# Patient Record
Sex: FEMALE | Race: BLACK OR AFRICAN AMERICAN | Employment: FULL TIME | URBAN - METROPOLITAN AREA
[De-identification: names, ages, dates, MRNs, and addresses within clinical notes are randomized per-mention and may not be internally consistent; named-entity substitution may affect disease eponyms.]

---

## 2017-08-07 ENCOUNTER — LAB ENCOUNTER (OUTPATIENT)
Dept: LAB | Facility: HOSPITAL | Age: 22
End: 2017-08-07
Attending: GENERAL PRACTICE
Payer: COMMERCIAL

## 2017-08-07 DIAGNOSIS — Z00.00 ADULT GENERAL MEDICAL EXAM: Primary | ICD-10-CM

## 2017-08-07 LAB
ALBUMIN SERPL BCP-MCNC: 4.4 G/DL (ref 3.5–4.8)
ALBUMIN/GLOB SERPL: 1.3 {RATIO} (ref 1–2)
ALP SERPL-CCNC: 35 U/L (ref 32–100)
ALT SERPL-CCNC: 14 U/L (ref 14–54)
ANION GAP SERPL CALC-SCNC: 8 MMOL/L (ref 0–18)
AST SERPL-CCNC: 18 U/L (ref 15–41)
BACTERIA UR QL AUTO: NEGATIVE /HPF
BASOPHILS # BLD: 0 K/UL (ref 0–0.2)
BASOPHILS NFR BLD: 1 %
BILIRUB SERPL-MCNC: 1.2 MG/DL (ref 0.3–1.2)
BILIRUB UR QL: NEGATIVE
BUN SERPL-MCNC: 9 MG/DL (ref 8–20)
BUN/CREAT SERPL: 11 (ref 10–20)
CALCIUM SERPL-MCNC: 9.7 MG/DL (ref 8.5–10.5)
CHLORIDE SERPL-SCNC: 108 MMOL/L (ref 95–110)
CHOLEST SERPL-MCNC: 122 MG/DL (ref 110–200)
CLARITY UR: CLEAR
CO2 SERPL-SCNC: 24 MMOL/L (ref 22–32)
COLOR UR: YELLOW
CREAT SERPL-MCNC: 0.82 MG/DL (ref 0.5–1.5)
EOSINOPHIL # BLD: 0.1 K/UL (ref 0–0.7)
EOSINOPHIL NFR BLD: 1 %
ERYTHROCYTE [DISTWIDTH] IN BLOOD BY AUTOMATED COUNT: 14.5 % (ref 11–15)
GLOBULIN PLAS-MCNC: 3.3 G/DL (ref 2.5–3.7)
GLUCOSE SERPL-MCNC: 126 MG/DL (ref 70–99)
GLUCOSE UR-MCNC: NEGATIVE MG/DL
HBA1C MFR BLD: 5.2 % (ref 4–6)
HCT VFR BLD AUTO: 39.3 % (ref 35–48)
HDLC SERPL-MCNC: 43 MG/DL
HGB BLD-MCNC: 13 G/DL (ref 12–16)
LDLC SERPL CALC-MCNC: 70 MG/DL (ref 0–99)
LEUKOCYTE ESTERASE UR QL STRIP.AUTO: NEGATIVE
LYMPHOCYTES # BLD: 1.9 K/UL (ref 1–4)
LYMPHOCYTES NFR BLD: 28 %
MCH RBC QN AUTO: 28.3 PG (ref 27–32)
MCHC RBC AUTO-ENTMCNC: 33.1 G/DL (ref 32–37)
MCV RBC AUTO: 85.5 FL (ref 80–100)
MONOCYTES # BLD: 0.6 K/UL (ref 0–1)
MONOCYTES NFR BLD: 9 %
NEUTROPHILS # BLD AUTO: 4.4 K/UL (ref 1.8–7.7)
NEUTROPHILS NFR BLD: 62 %
NITRITE UR QL STRIP.AUTO: NEGATIVE
NONHDLC SERPL-MCNC: 79 MG/DL
OSMOLALITY UR CALC.SUM OF ELEC: 290 MOSM/KG (ref 275–295)
PH UR: 5 [PH] (ref 5–8)
PLATELET # BLD AUTO: 201 K/UL (ref 140–400)
PMV BLD AUTO: 9 FL (ref 7.4–10.3)
POTASSIUM SERPL-SCNC: 3.9 MMOL/L (ref 3.3–5.1)
PROT SERPL-MCNC: 7.7 G/DL (ref 5.9–8.4)
PROT UR-MCNC: 30 MG/DL
RBC # BLD AUTO: 4.59 M/UL (ref 3.7–5.4)
RBC #/AREA URNS AUTO: 2 /HPF
SODIUM SERPL-SCNC: 140 MMOL/L (ref 136–144)
SP GR UR STRIP: 1.03 (ref 1–1.03)
T3FREE SERPL-MCNC: 3.51 PG/ML (ref 2.53–4.29)
T4 FREE SERPL-MCNC: 0.84 NG/DL (ref 0.58–1.64)
TRIGL SERPL-MCNC: 45 MG/DL (ref 1–149)
TSH SERPL-ACNC: 0.44 UIU/ML (ref 0.45–5.33)
UROBILINOGEN UR STRIP-ACNC: <2
VIT C UR-MCNC: NEGATIVE MG/DL
WBC # BLD AUTO: 7 K/UL (ref 4–11)
WBC #/AREA URNS AUTO: <1 /HPF

## 2017-08-07 PROCEDURE — 84439 ASSAY OF FREE THYROXINE: CPT

## 2017-08-07 PROCEDURE — 83036 HEMOGLOBIN GLYCOSYLATED A1C: CPT

## 2017-08-07 PROCEDURE — 85025 COMPLETE CBC W/AUTO DIFF WBC: CPT

## 2017-08-07 PROCEDURE — 80053 COMPREHEN METABOLIC PANEL: CPT

## 2017-08-07 PROCEDURE — 84480 ASSAY TRIIODOTHYRONINE (T3): CPT

## 2017-08-07 PROCEDURE — 36415 COLL VENOUS BLD VENIPUNCTURE: CPT

## 2017-08-07 PROCEDURE — 81001 URINALYSIS AUTO W/SCOPE: CPT

## 2017-08-07 PROCEDURE — 80061 LIPID PANEL: CPT

## 2017-08-07 PROCEDURE — 84443 ASSAY THYROID STIM HORMONE: CPT

## 2017-08-07 PROCEDURE — 84481 FREE ASSAY (FT-3): CPT

## 2017-08-08 LAB
T3 SERPL-MCNC: 0.96 NG/ML (ref 0.87–1.78)
T4 SERPL-MCNC: 7.5 MCG/DL (ref 6.1–12.2)

## 2018-03-14 ENCOUNTER — OFFICE VISIT (OUTPATIENT)
Dept: INTERNAL MEDICINE CLINIC | Facility: CLINIC | Age: 23
End: 2018-03-14

## 2018-03-14 VITALS
WEIGHT: 164 LBS | OXYGEN SATURATION: 100 % | DIASTOLIC BLOOD PRESSURE: 78 MMHG | HEIGHT: 67 IN | TEMPERATURE: 99 F | BODY MASS INDEX: 25.74 KG/M2 | SYSTOLIC BLOOD PRESSURE: 128 MMHG | HEART RATE: 84 BPM

## 2018-03-14 DIAGNOSIS — R68.89 COLD INTOLERANCE: Primary | ICD-10-CM

## 2018-03-14 DIAGNOSIS — R79.89 ABNORMAL THYROID BLOOD TEST: ICD-10-CM

## 2018-03-14 DIAGNOSIS — Z86.19 HISTORY OF HPV INFECTION: ICD-10-CM

## 2018-03-14 DIAGNOSIS — E61.1 IRON DEFICIENCY: ICD-10-CM

## 2018-03-14 DIAGNOSIS — Z83.49 FAMILY HISTORY OF THYROID DISEASE: ICD-10-CM

## 2018-03-14 LAB
BASOPHILS # BLD: 0 K/UL (ref 0–0.2)
BASOPHILS NFR BLD: 1 %
EOSINOPHIL # BLD: 0.1 K/UL (ref 0–0.7)
EOSINOPHIL NFR BLD: 1 %
ERYTHROCYTE [DISTWIDTH] IN BLOOD BY AUTOMATED COUNT: 14.8 % (ref 11–15)
HCT VFR BLD AUTO: 40.1 % (ref 35–48)
HGB BLD-MCNC: 13.3 G/DL (ref 12–16)
IRON SATN MFR SERPL: 24 % (ref 15–50)
IRON SERPL-MCNC: 90 MCG/DL (ref 28–170)
LYMPHOCYTES # BLD: 2.2 K/UL (ref 1–4)
LYMPHOCYTES NFR BLD: 28 %
MCH RBC QN AUTO: 28 PG (ref 27–32)
MCHC RBC AUTO-ENTMCNC: 33.2 G/DL (ref 32–37)
MCV RBC AUTO: 84.5 FL (ref 80–100)
MONOCYTES # BLD: 0.8 K/UL (ref 0–1)
MONOCYTES NFR BLD: 10 %
NEUTROPHILS # BLD AUTO: 4.7 K/UL (ref 1.8–7.7)
NEUTROPHILS NFR BLD: 60 %
PLATELET # BLD AUTO: 231 K/UL (ref 140–400)
PMV BLD AUTO: 9.1 FL (ref 7.4–10.3)
RBC # BLD AUTO: 4.75 M/UL (ref 3.7–5.4)
TIBC SERPL-MCNC: 370 MCG/DL (ref 228–428)
TRANSFERRIN SERPL-MCNC: 280 MG/DL (ref 192–382)
WBC # BLD AUTO: 7.8 K/UL (ref 4–11)

## 2018-03-14 PROCEDURE — 36415 COLL VENOUS BLD VENIPUNCTURE: CPT | Performed by: INTERNAL MEDICINE

## 2018-03-14 PROCEDURE — 84443 ASSAY THYROID STIM HORMONE: CPT | Performed by: INTERNAL MEDICINE

## 2018-03-14 PROCEDURE — 84439 ASSAY OF FREE THYROXINE: CPT | Performed by: INTERNAL MEDICINE

## 2018-03-14 PROCEDURE — 86376 MICROSOMAL ANTIBODY EACH: CPT | Performed by: INTERNAL MEDICINE

## 2018-03-14 PROCEDURE — 99203 OFFICE O/P NEW LOW 30 MIN: CPT | Performed by: INTERNAL MEDICINE

## 2018-03-14 PROCEDURE — 82728 ASSAY OF FERRITIN: CPT | Performed by: INTERNAL MEDICINE

## 2018-03-14 PROCEDURE — 84480 ASSAY TRIIODOTHYRONINE (T3): CPT | Performed by: INTERNAL MEDICINE

## 2018-03-14 PROCEDURE — 83540 ASSAY OF IRON: CPT | Performed by: INTERNAL MEDICINE

## 2018-03-14 PROCEDURE — 85025 COMPLETE CBC W/AUTO DIFF WBC: CPT | Performed by: INTERNAL MEDICINE

## 2018-03-14 PROCEDURE — 84466 ASSAY OF TRANSFERRIN: CPT | Performed by: INTERNAL MEDICINE

## 2018-03-14 PROCEDURE — 82306 VITAMIN D 25 HYDROXY: CPT | Performed by: INTERNAL MEDICINE

## 2018-03-14 RX ORDER — MEDROXYPROGESTERONE ACETATE 150 MG/ML
INJECTION, SUSPENSION INTRAMUSCULAR
Refills: 3 | COMMUNITY
Start: 2018-02-05 | End: 2018-05-18

## 2018-03-15 LAB
FERRITIN SERPL IA-MCNC: 32 NG/ML (ref 11–307)
T3 SERPL-MCNC: 1.2 NG/ML (ref 0.87–1.78)
T4 FREE SERPL-MCNC: 0.76 NG/DL (ref 0.58–1.64)
THYROPEROXIDASE AB SERPL-ACNC: 0.3 IU/ML (ref 0–9)
TSH SERPL-ACNC: 0.46 UIU/ML (ref 0.45–5.33)

## 2018-03-16 LAB — 25(OH)D3 SERPL-MCNC: 9.1 NG/ML

## 2018-03-19 ENCOUNTER — TELEPHONE (OUTPATIENT)
Dept: INTERNAL MEDICINE CLINIC | Facility: CLINIC | Age: 23
End: 2018-03-19

## 2018-03-19 RX ORDER — ERGOCALCIFEROL 1.25 MG/1
50000 CAPSULE ORAL WEEKLY
Qty: 12 CAPSULE | Refills: 1 | Status: SHIPPED | OUTPATIENT
Start: 2018-03-19 | End: 2018-04-20

## 2018-03-20 ENCOUNTER — TELEPHONE (OUTPATIENT)
Dept: INTERNAL MEDICINE CLINIC | Facility: CLINIC | Age: 23
End: 2018-03-20

## 2018-03-22 ENCOUNTER — TELEPHONE (OUTPATIENT)
Dept: INTERNAL MEDICINE CLINIC | Facility: CLINIC | Age: 23
End: 2018-03-22

## 2018-03-22 NOTE — TELEPHONE ENCOUNTER
Dear Maye Denver,   Your blood work are within normal except for low vitamin d :Low vitamin d : recommend prescription strength vitamin d once per week x 6 months( sent to pharmacy ) .    Your iron is normal but on the lower normal make sure you take multivitam

## 2018-04-03 ENCOUNTER — OFFICE VISIT (OUTPATIENT)
Dept: INTERNAL MEDICINE CLINIC | Facility: CLINIC | Age: 23
End: 2018-04-03

## 2018-04-03 VITALS
HEIGHT: 67 IN | TEMPERATURE: 99 F | OXYGEN SATURATION: 100 % | DIASTOLIC BLOOD PRESSURE: 70 MMHG | HEART RATE: 76 BPM | WEIGHT: 168 LBS | BODY MASS INDEX: 26.37 KG/M2 | SYSTOLIC BLOOD PRESSURE: 118 MMHG

## 2018-04-03 DIAGNOSIS — Z12.4 SCREENING FOR CERVICAL CANCER: ICD-10-CM

## 2018-04-03 DIAGNOSIS — Z11.8 SCREENING FOR CHLAMYDIAL DISEASE: ICD-10-CM

## 2018-04-03 DIAGNOSIS — N89.8 FOUL SMELLING VAGINAL DISCHARGE: ICD-10-CM

## 2018-04-03 DIAGNOSIS — Z00.00 ANNUAL PHYSICAL EXAM: Primary | ICD-10-CM

## 2018-04-03 PROCEDURE — 99395 PREV VISIT EST AGE 18-39: CPT | Performed by: INTERNAL MEDICINE

## 2018-04-03 PROCEDURE — 87205 SMEAR GRAM STAIN: CPT | Performed by: INTERNAL MEDICINE

## 2018-04-03 PROCEDURE — 87491 CHLMYD TRACH DNA AMP PROBE: CPT | Performed by: INTERNAL MEDICINE

## 2018-04-03 PROCEDURE — 87624 HPV HI-RISK TYP POOLED RSLT: CPT | Performed by: INTERNAL MEDICINE

## 2018-04-03 PROCEDURE — 87591 N.GONORRHOEAE DNA AMP PROB: CPT | Performed by: INTERNAL MEDICINE

## 2018-04-03 PROCEDURE — 87808 TRICHOMONAS ASSAY W/OPTIC: CPT | Performed by: INTERNAL MEDICINE

## 2018-04-03 PROCEDURE — 87106 FUNGI IDENTIFICATION YEAST: CPT | Performed by: INTERNAL MEDICINE

## 2018-04-03 NOTE — PROGRESS NOTES
Tanisha Alicea is a 20year old female.     Chief complaint:Annual physical exam   HPI:   20year old female with PMH as listed below here for   Annual physical exam   Patient reports feeling well   No chest pain no sob no abdominal pain  No diarrhea or c the the HPI            EXAM:   /70   Pulse 76   Temp 99.1 °F (37.3 °C)   Ht 67\"   Wt 168 lb   LMP 03/01/2018 (Exact Date)   SpO2 100%   BMI 26.31 kg/m²   GENERAL: well developed, well nourished,in no apparent distress  SKIN: no rashes,no suspicious

## 2018-04-20 ENCOUNTER — OFFICE VISIT (OUTPATIENT)
Dept: OBGYN CLINIC | Facility: CLINIC | Age: 23
End: 2018-04-20

## 2018-04-20 VITALS
DIASTOLIC BLOOD PRESSURE: 74 MMHG | HEIGHT: 67 IN | HEART RATE: 73 BPM | WEIGHT: 170 LBS | BODY MASS INDEX: 26.68 KG/M2 | SYSTOLIC BLOOD PRESSURE: 129 MMHG

## 2018-04-20 DIAGNOSIS — Z30.09 BIRTH CONTROL COUNSELING: Primary | ICD-10-CM

## 2018-04-20 PROCEDURE — 99214 OFFICE O/P EST MOD 30 MIN: CPT | Performed by: OBSTETRICS & GYNECOLOGY

## 2018-04-20 NOTE — PROGRESS NOTES
Esperanza Nicholson is a 20year old female [id number] Patient's last menstrual period was 04/01/2018. Patient presents today for birth control counseling. She had her annual exam with PCP with normal pap and HPV negative.  She is currently on Depo and doing we Allergies      Review of Systems:  Constitutional:  Denies fevers or chills   Cardiovascular:  denies chest pain or palpitations  Respiratory:  denies shortness of breath  Gastrointestinal:  denies nausea, vomiting, diarrhea or constipation  Genitourinary:

## 2018-04-23 RX ORDER — ERGOCALCIFEROL 1.25 MG/1
50000 CAPSULE ORAL WEEKLY
Qty: 12 CAPSULE | Refills: 1 | Status: SHIPPED
Start: 2018-04-23 | End: 2018-05-18

## 2018-04-23 NOTE — TELEPHONE ENCOUNTER
From: Seun Martinez  Sent: 4/20/2018 4:59 PM CDT  Subject: Medication Renewal Request    Mónica Bansal would like a refill of the following medications:     ergocalciferol 92732 units Oral Cap Rochelle Burciaga MD]    [facility]

## 2018-05-18 ENCOUNTER — OFFICE VISIT (OUTPATIENT)
Dept: OBGYN CLINIC | Facility: CLINIC | Age: 23
End: 2018-05-18

## 2018-05-18 VITALS
WEIGHT: 176 LBS | SYSTOLIC BLOOD PRESSURE: 129 MMHG | DIASTOLIC BLOOD PRESSURE: 80 MMHG | BODY MASS INDEX: 28 KG/M2 | HEART RATE: 79 BPM

## 2018-05-18 DIAGNOSIS — Z32.00 PREGNANCY EXAMINATION OR TEST, PREGNANCY UNCONFIRMED: Primary | ICD-10-CM

## 2018-05-18 DIAGNOSIS — Z30.017 INSERTION OF IMPLANTABLE SUBDERMAL CONTRACEPTIVE: ICD-10-CM

## 2018-05-18 PROCEDURE — 81025 URINE PREGNANCY TEST: CPT | Performed by: OBSTETRICS & GYNECOLOGY

## 2018-05-18 PROCEDURE — 11981 INSERTION DRUG DLVR IMPLANT: CPT | Performed by: OBSTETRICS & GYNECOLOGY

## 2018-05-18 NOTE — PROCEDURES
Nexplanon Insertion    Pregnancy Results: negative  Consent was obtained from the patient. Insertion  The patient was positioned with her left arm flexed. Measurement was taken from her epicondyle approximately 8 cm and marked.   2% lidocaine with e

## 2018-05-18 NOTE — TELEPHONE ENCOUNTER
From: Sanchez Hook  Sent: 5/18/2018 2:10 PM CDT  Subject: Medication Renewal Request    Mónica Hess would like a refill of the following medications:     ergocalciferol 58455 units Oral Cap Jacqueline Arteaga MD]    Preferred pharmacy: [facility]

## 2018-05-21 RX ORDER — ERGOCALCIFEROL 1.25 MG/1
50000 CAPSULE ORAL WEEKLY
Qty: 12 CAPSULE | Refills: 1 | Status: SHIPPED
Start: 2018-05-21 | End: 2018-08-07

## 2019-04-16 ENCOUNTER — LAB ENCOUNTER (OUTPATIENT)
Dept: LAB | Facility: HOSPITAL | Age: 24
End: 2019-04-16
Attending: INTERNAL MEDICINE
Payer: MEDICAID

## 2019-04-16 ENCOUNTER — OFFICE VISIT (OUTPATIENT)
Dept: INTERNAL MEDICINE CLINIC | Facility: CLINIC | Age: 24
End: 2019-04-16
Payer: MEDICAID

## 2019-04-16 VITALS
TEMPERATURE: 99 F | HEART RATE: 85 BPM | SYSTOLIC BLOOD PRESSURE: 124 MMHG | BODY MASS INDEX: 29.03 KG/M2 | RESPIRATION RATE: 17 BRPM | DIASTOLIC BLOOD PRESSURE: 62 MMHG | WEIGHT: 185 LBS | OXYGEN SATURATION: 99 % | HEIGHT: 67 IN

## 2019-04-16 DIAGNOSIS — Z02.89 ENCOUNTER FOR COMPLETION OF FORM WITH PATIENT: ICD-10-CM

## 2019-04-16 DIAGNOSIS — Z00.00 ANNUAL PHYSICAL EXAM: Primary | ICD-10-CM

## 2019-04-16 DIAGNOSIS — Z00.00 ANNUAL PHYSICAL EXAM: ICD-10-CM

## 2019-04-16 PROCEDURE — 80053 COMPREHEN METABOLIC PANEL: CPT

## 2019-04-16 PROCEDURE — 80061 LIPID PANEL: CPT

## 2019-04-16 PROCEDURE — 90715 TDAP VACCINE 7 YRS/> IM: CPT | Performed by: INTERNAL MEDICINE

## 2019-04-16 PROCEDURE — 86762 RUBELLA ANTIBODY: CPT

## 2019-04-16 PROCEDURE — 36415 COLL VENOUS BLD VENIPUNCTURE: CPT

## 2019-04-16 PROCEDURE — 90471 IMMUNIZATION ADMIN: CPT | Performed by: INTERNAL MEDICINE

## 2019-04-16 PROCEDURE — 86480 TB TEST CELL IMMUN MEASURE: CPT

## 2019-04-16 PROCEDURE — 99395 PREV VISIT EST AGE 18-39: CPT | Performed by: INTERNAL MEDICINE

## 2019-04-16 PROCEDURE — 85025 COMPLETE CBC W/AUTO DIFF WBC: CPT

## 2019-04-16 PROCEDURE — 86765 RUBEOLA ANTIBODY: CPT

## 2019-04-16 PROCEDURE — 86735 MUMPS ANTIBODY: CPT

## 2019-04-16 NOTE — PROGRESS NOTES
Bryant Melendez is a 24year old female.     Chief complaint: annual physical exam     HPI:   24year old female with PMH as listed below here for   Annual physical exam   Patient reports feeling well       No chest pain no sob no abdominal pain  No diarrh kg/m²   GENERAL: well developed, well nourished,in no apparent distress  SKIN: no rashes,no suspicious lesions  HEENT: atraumatic, normocephalic,ears and throat are clear  NECK: supple,no adenopathy  Breast exam : normal no lumps no discharge     LUNGS: cl

## 2019-04-18 ENCOUNTER — TELEPHONE (OUTPATIENT)
Dept: INTERNAL MEDICINE CLINIC | Facility: CLINIC | Age: 24
End: 2019-04-18

## 2019-04-18 ENCOUNTER — NURSE ONLY (OUTPATIENT)
Dept: INTERNAL MEDICINE CLINIC | Facility: CLINIC | Age: 24
End: 2019-04-18
Payer: MEDICAID

## 2019-04-18 PROCEDURE — 90471 IMMUNIZATION ADMIN: CPT | Performed by: INTERNAL MEDICINE

## 2019-04-18 PROCEDURE — 90707 MMR VACCINE SC: CPT | Performed by: INTERNAL MEDICINE

## 2019-04-18 NOTE — TELEPHONE ENCOUNTER
Spoke to patient will come in today to get MMR - & needs buster in 1 month per Dr. Alexa Fagan. I will take form to [facility] 5 Monday so she can fill it out.

## 2019-11-15 ENCOUNTER — NURSE ONLY (OUTPATIENT)
Dept: INTERNAL MEDICINE CLINIC | Facility: CLINIC | Age: 24
End: 2019-11-15

## 2019-11-15 PROCEDURE — 90471 IMMUNIZATION ADMIN: CPT | Performed by: INTERNAL MEDICINE

## 2019-11-15 PROCEDURE — 90707 MMR VACCINE SC: CPT | Performed by: INTERNAL MEDICINE

## 2020-06-17 ENCOUNTER — APPOINTMENT (OUTPATIENT)
Dept: CT IMAGING | Facility: HOSPITAL | Age: 25
End: 2020-06-17
Attending: PHYSICIAN ASSISTANT
Payer: MEDICAID

## 2020-06-17 ENCOUNTER — HOSPITAL ENCOUNTER (EMERGENCY)
Facility: HOSPITAL | Age: 25
Discharge: HOME | End: 2020-06-17
Attending: PHYSICIAN ASSISTANT
Payer: MEDICAID

## 2020-06-17 VITALS
DIASTOLIC BLOOD PRESSURE: 86 MMHG | OXYGEN SATURATION: 100 % | HEART RATE: 75 BPM | SYSTOLIC BLOOD PRESSURE: 116 MMHG | RESPIRATION RATE: 18 BRPM | TEMPERATURE: 99 F

## 2020-06-17 DIAGNOSIS — R55 SYNCOPE, UNSPECIFIED SYNCOPE TYPE: Primary | ICD-10-CM

## 2020-06-17 PROCEDURE — 99285 EMERGENCY DEPT VISIT HI MDM: CPT

## 2020-06-17 PROCEDURE — 96360 HYDRATION IV INFUSION INIT: CPT

## 2020-06-17 PROCEDURE — 96361 HYDRATE IV INFUSION ADD-ON: CPT

## 2020-06-17 PROCEDURE — 81001 URINALYSIS AUTO W/SCOPE: CPT | Performed by: PHYSICIAN ASSISTANT

## 2020-06-17 PROCEDURE — 80048 BASIC METABOLIC PNL TOTAL CA: CPT | Performed by: PHYSICIAN ASSISTANT

## 2020-06-17 PROCEDURE — 70450 CT HEAD/BRAIN W/O DYE: CPT | Performed by: PHYSICIAN ASSISTANT

## 2020-06-17 PROCEDURE — 93005 ELECTROCARDIOGRAM TRACING: CPT

## 2020-06-17 PROCEDURE — 81025 URINE PREGNANCY TEST: CPT

## 2020-06-17 PROCEDURE — 93010 ELECTROCARDIOGRAM REPORT: CPT | Performed by: PHYSICIAN ASSISTANT

## 2020-06-17 PROCEDURE — 85025 COMPLETE CBC W/AUTO DIFF WBC: CPT | Performed by: PHYSICIAN ASSISTANT

## 2020-06-17 NOTE — ED PROVIDER NOTES
Patient Seen in: [facility] AND [facility] Emergency Department    History   Patient presents with:  Syncope    Stated Complaint:     HPI    19-year-old female presents with chief complaint of syncope.   Onset 30 minutes prior to arrival.  Patient states that i vital signs reviewed. All other systems reviewed and negative except as noted above. PSFH elements reviewed from today and agreed except as otherwise stated in HPI.     Physical Exam     ED Triage Vitals   BP 06/17/20 1210 122/73   Pulse 06/17/20 12 of all extremities x4. Patient exhibits normal speech. Normal gait. No limb ataxia. Skin: Skin is normal to inspection and palpation. Warm and dry. No obvious bruising. No obvious rash.       ED Course     Labs Reviewed   BASIC METABOLIC PANEL (8) - [place] [id number]  [phone]    Schedule an appointment as soon as possible for a visit in 2 days  For follow-up    We recommend that you schedule follow up care with a primary care provider within the next three months to obtain basic health screening including re

## 2021-06-23 ENCOUNTER — TELEPHONE (OUTPATIENT)
Dept: OBGYN CLINIC | Facility: CLINIC | Age: 26
End: 2021-06-23

## 2021-06-23 NOTE — TELEPHONE ENCOUNTER
Former patient of Dr Soto Stark would like to schedule an appointment to have her Nexplanon removed and replaced. Please call.

## 2021-06-23 NOTE — TELEPHONE ENCOUNTER
PSR pt needs to come in for annual first, please assist with scheduling. Then will need a separate appt for Nexplanon removal and insertion. Pt has not been seen since Nexplanon insertion in May 2018.

## 2021-06-30 ENCOUNTER — LAB ENCOUNTER (OUTPATIENT)
Dept: LAB | Age: 26
End: 2021-06-30
Attending: FAMILY MEDICINE
Payer: MEDICAID

## 2021-06-30 ENCOUNTER — OFFICE VISIT (OUTPATIENT)
Dept: FAMILY MEDICINE CLINIC | Facility: CLINIC | Age: 26
End: 2021-06-30
Payer: MEDICAID

## 2021-06-30 VITALS
HEIGHT: 67 IN | TEMPERATURE: 98 F | BODY MASS INDEX: 28.66 KG/M2 | DIASTOLIC BLOOD PRESSURE: 74 MMHG | SYSTOLIC BLOOD PRESSURE: 124 MMHG | RESPIRATION RATE: 18 BRPM | HEART RATE: 72 BPM | WEIGHT: 182.63 LBS

## 2021-06-30 DIAGNOSIS — Z13.29 THYROID DISORDER SCREEN: ICD-10-CM

## 2021-06-30 DIAGNOSIS — Z13.220 LIPID SCREENING: ICD-10-CM

## 2021-06-30 DIAGNOSIS — Z00.00 WELLNESS EXAMINATION: Primary | ICD-10-CM

## 2021-06-30 DIAGNOSIS — L30.9 ECZEMA, UNSPECIFIED TYPE: ICD-10-CM

## 2021-06-30 DIAGNOSIS — Z13.0 SCREENING FOR DEFICIENCY ANEMIA: ICD-10-CM

## 2021-06-30 DIAGNOSIS — Z00.00 WELLNESS EXAMINATION: ICD-10-CM

## 2021-06-30 DIAGNOSIS — Z30.46 ENCOUNTER FOR SURVEILLANCE OF NEXPLANON SUBDERMAL CONTRACEPTIVE: ICD-10-CM

## 2021-06-30 LAB
ALBUMIN SERPL-MCNC: 3.5 G/DL (ref 3.4–5)
ALBUMIN/GLOB SERPL: 0.8 {RATIO} (ref 1–2)
ALP LIVER SERPL-CCNC: 47 U/L
ALT SERPL-CCNC: 24 U/L
ANION GAP SERPL CALC-SCNC: 8 MMOL/L (ref 0–18)
AST SERPL-CCNC: 18 U/L (ref 15–37)
BASOPHILS # BLD AUTO: 0.04 X10(3) UL (ref 0–0.2)
BASOPHILS NFR BLD AUTO: 0.6 %
BILIRUB SERPL-MCNC: 0.3 MG/DL (ref 0.1–2)
BUN BLD-MCNC: 10 MG/DL (ref 7–18)
BUN/CREAT SERPL: 16.7 (ref 10–20)
CALCIUM BLD-MCNC: 9.3 MG/DL (ref 8.5–10.1)
CHLORIDE SERPL-SCNC: 110 MMOL/L (ref 98–112)
CHOLEST SMN-MCNC: 145 MG/DL (ref ?–200)
CO2 SERPL-SCNC: 23 MMOL/L (ref 21–32)
CREAT BLD-MCNC: 0.6 MG/DL
DEPRECATED RDW RBC AUTO: 45.5 FL (ref 35.1–46.3)
EOSINOPHIL # BLD AUTO: 0.24 X10(3) UL (ref 0–0.7)
EOSINOPHIL NFR BLD AUTO: 3.7 %
ERYTHROCYTE [DISTWIDTH] IN BLOOD BY AUTOMATED COUNT: 14.2 % (ref 11–15)
GLOBULIN PLAS-MCNC: 4.2 G/DL (ref 2.8–4.4)
GLUCOSE BLD-MCNC: 98 MG/DL (ref 70–99)
HCT VFR BLD AUTO: 39.8 %
HDLC SERPL-MCNC: 53 MG/DL (ref 40–59)
HGB BLD-MCNC: 12.4 G/DL
IMM GRANULOCYTES # BLD AUTO: 0.01 X10(3) UL (ref 0–1)
IMM GRANULOCYTES NFR BLD: 0.2 %
LDLC SERPL CALC-MCNC: 79 MG/DL (ref ?–100)
LYMPHOCYTES # BLD AUTO: 2.28 X10(3) UL (ref 1–4)
LYMPHOCYTES NFR BLD AUTO: 35 %
M PROTEIN MFR SERPL ELPH: 7.7 G/DL (ref 6.4–8.2)
MCH RBC QN AUTO: 27.1 PG (ref 26–34)
MCHC RBC AUTO-ENTMCNC: 31.2 G/DL (ref 31–37)
MCV RBC AUTO: 87.1 FL
MONOCYTES # BLD AUTO: 0.69 X10(3) UL (ref 0.1–1)
MONOCYTES NFR BLD AUTO: 10.6 %
NEUTROPHILS # BLD AUTO: 3.25 X10 (3) UL (ref 1.5–7.7)
NEUTROPHILS # BLD AUTO: 3.25 X10(3) UL (ref 1.5–7.7)
NEUTROPHILS NFR BLD AUTO: 49.9 %
NONHDLC SERPL-MCNC: 92 MG/DL (ref ?–130)
OSMOLALITY SERPL CALC.SUM OF ELEC: 291 MOSM/KG (ref 275–295)
PATIENT FASTING Y/N/NP: YES
PATIENT FASTING Y/N/NP: YES
PLATELET # BLD AUTO: 246 10(3)UL (ref 150–450)
POTASSIUM SERPL-SCNC: 4.2 MMOL/L (ref 3.5–5.1)
RBC # BLD AUTO: 4.57 X10(6)UL
SODIUM SERPL-SCNC: 141 MMOL/L (ref 136–145)
TRIGL SERPL-MCNC: 64 MG/DL (ref 30–149)
TSI SER-ACNC: 0.77 MIU/ML (ref 0.36–3.74)
VLDLC SERPL CALC-MCNC: 10 MG/DL (ref 0–30)
WBC # BLD AUTO: 6.5 X10(3) UL (ref 4–11)

## 2021-06-30 PROCEDURE — 3078F DIAST BP <80 MM HG: CPT | Performed by: FAMILY MEDICINE

## 2021-06-30 PROCEDURE — 99385 PREV VISIT NEW AGE 18-39: CPT | Performed by: FAMILY MEDICINE

## 2021-06-30 PROCEDURE — 80053 COMPREHEN METABOLIC PANEL: CPT

## 2021-06-30 PROCEDURE — 3074F SYST BP LT 130 MM HG: CPT | Performed by: FAMILY MEDICINE

## 2021-06-30 PROCEDURE — 3008F BODY MASS INDEX DOCD: CPT | Performed by: FAMILY MEDICINE

## 2021-06-30 PROCEDURE — 85025 COMPLETE CBC W/AUTO DIFF WBC: CPT

## 2021-06-30 PROCEDURE — 36415 COLL VENOUS BLD VENIPUNCTURE: CPT

## 2021-06-30 PROCEDURE — 84443 ASSAY THYROID STIM HORMONE: CPT

## 2021-06-30 PROCEDURE — 80061 LIPID PANEL: CPT

## 2021-06-30 NOTE — PROGRESS NOTES
HPI:   Stephanie Ruiz is a 31year old female who presents for an Annual Health Visit. Patient is also here to establish care, she had Nexplanon placed 3 years ago, she has had very good tolerance to device but is due for replacement.     Patient Respiratory: Negative. Cardiovascular: Negative. Gastrointestinal: Negative. Skin: Positive for rash. Neurological: Negative.           EXAM:   /74   Pulse 72   Temp 98.2 °F (36.8 °C) (Oral)   Resp 18   Ht 5' 7\" (1.702 m)   Wt 182 lb 9.6 PLATELET;  Future    Thyroid disorder screen  -     TSH W REFLEX TO FREE T4; Future    Encounter for surveillance of Nexplanon subdermal contraceptive  -     OBG - INTERNAL    Eczema, unspecified type  -     DERM - INTERNAL      Nexplanon was palpable but n you're at risk or have symptoms    Depression All women in this age group  At routine exams   Diabetes mellitus, type 2  Adults with no symptoms who are overweight or obese and have 1 or more other risk factors for diabetes  At least every 3 years.  Also, t All women in this age group who have no record of these infections or vaccines  1 or 2 doses   Meningococcal Women at increased risk for infection should talk with their healthcare provider  1 or more doses   Pneumococcal conjugate vaccine (PCV13) and pne 3 The USPSTF recommends that all people ages 13 to 72 years be screened for HIV and those younger or older people at increased risk.  The CDC recommends that everyone between the ages of 15 and 59 get tested for HIV at least once as part of routine health

## 2021-07-21 ENCOUNTER — OFFICE VISIT (OUTPATIENT)
Dept: OBGYN CLINIC | Facility: CLINIC | Age: 26
End: 2021-07-21
Payer: MEDICAID

## 2021-07-21 VITALS
HEART RATE: 79 BPM | BODY MASS INDEX: 30 KG/M2 | DIASTOLIC BLOOD PRESSURE: 77 MMHG | WEIGHT: 191 LBS | SYSTOLIC BLOOD PRESSURE: 115 MMHG

## 2021-07-21 DIAGNOSIS — Z32.00 PREGNANCY EXAMINATION OR TEST, PREGNANCY UNCONFIRMED: ICD-10-CM

## 2021-07-21 DIAGNOSIS — Z11.3 SCREENING EXAMINATION FOR STD (SEXUALLY TRANSMITTED DISEASE): ICD-10-CM

## 2021-07-21 DIAGNOSIS — Z30.017 INSERTION OF IMPLANTABLE SUBDERMAL CONTRACEPTIVE: Primary | ICD-10-CM

## 2021-07-21 LAB — CONTROL LINE PRESENT WITH A CLEAR BACKGROUND (YES/NO): YES YES/NO

## 2021-07-21 PROCEDURE — 3074F SYST BP LT 130 MM HG: CPT | Performed by: ADVANCED PRACTICE MIDWIFE

## 2021-07-21 PROCEDURE — 3078F DIAST BP <80 MM HG: CPT | Performed by: ADVANCED PRACTICE MIDWIFE

## 2021-07-21 PROCEDURE — 81025 URINE PREGNANCY TEST: CPT | Performed by: ADVANCED PRACTICE MIDWIFE

## 2021-07-21 PROCEDURE — 11983 REMOVE/INSERT DRUG IMPLANT: CPT | Performed by: ADVANCED PRACTICE MIDWIFE

## 2021-07-21 NOTE — PROCEDURES
Nexplanon Insertion/Removal    Pregnancy Results: negative from urine test   Birth control method(s) used:   Nexplanon    Consent was obtained from the patient.     Removal:  1 % lidocaine with Epinephrine was injected underneath the tip of the Nexplanon ro

## 2021-07-22 ENCOUNTER — LAB ENCOUNTER (OUTPATIENT)
Dept: LAB | Facility: HOSPITAL | Age: 26
End: 2021-07-22
Attending: ADVANCED PRACTICE MIDWIFE
Payer: MEDICAID

## 2021-07-22 ENCOUNTER — TELEPHONE (OUTPATIENT)
Dept: OBGYN CLINIC | Facility: CLINIC | Age: 26
End: 2021-07-22

## 2021-07-22 DIAGNOSIS — A74.9 CHLAMYDIA: ICD-10-CM

## 2021-07-22 DIAGNOSIS — A74.9 CHLAMYDIA: Primary | ICD-10-CM

## 2021-07-22 LAB
C TRACH DNA SPEC QL NAA+PROBE: POSITIVE
HBV SURFACE AG SER-ACNC: <0.1 [IU]/L
HBV SURFACE AG SERPL QL IA: NONREACTIVE
HCV AB SERPL QL IA: NONREACTIVE
N GONORRHOEA DNA SPEC QL NAA+PROBE: NEGATIVE

## 2021-07-22 PROCEDURE — 86780 TREPONEMA PALLIDUM: CPT

## 2021-07-22 PROCEDURE — 87389 HIV-1 AG W/HIV-1&-2 AB AG IA: CPT

## 2021-07-22 PROCEDURE — 87340 HEPATITIS B SURFACE AG IA: CPT

## 2021-07-22 PROCEDURE — 86803 HEPATITIS C AB TEST: CPT

## 2021-07-22 PROCEDURE — 36415 COLL VENOUS BLD VENIPUNCTURE: CPT

## 2021-07-22 RX ORDER — AZITHROMYCIN 500 MG/1
TABLET, FILM COATED ORAL
Qty: 2 TABLET | Refills: 0 | Status: SHIPPED | OUTPATIENT
Start: 2021-07-22 | End: 2021-07-26

## 2021-07-22 NOTE — TELEPHONE ENCOUNTER
Pt gave EPT info [facility] 9/7/1996 MAR[facility] J[facility], same pharmacy as pt. Offered pt further STI testing for HIV, Trep, HBSAG & HCV & pt accepted. Instructed to go to lab to complete.  Pt verbalized an understanding & agrees w/ plan

## 2021-07-22 NOTE — TELEPHONE ENCOUNTER
Office notified of +chlamydia cx per lab    Notified pt of results. Discussed treatment, abstaining from intercourse x1 week following treatment, use of condoms & mario alberto in 3 mos. Pharmacy confirmed. Offered EPT.  Pt will call back w/ name(s), , allergies &

## 2021-07-23 ENCOUNTER — TELEPHONE (OUTPATIENT)
Dept: OBGYN CLINIC | Facility: CLINIC | Age: 26
End: 2021-07-23

## 2021-07-23 LAB — T PALLIDUM AB SER QL: NEGATIVE

## 2021-07-23 RX ORDER — AZITHROMYCIN 500 MG/1
TABLET, FILM COATED ORAL
Qty: 2 TABLET | Refills: 0 | Status: SHIPPED | OUTPATIENT
Start: 2021-07-23 | End: 2021-07-27

## 2021-07-23 NOTE — TELEPHONE ENCOUNTER
Pt states she took the Rx Azithromycin 500 mg #2 pills after eating left over tacos for dinner. Pt states she threw up 2 hours afterwards and this morning she woke up with diarrhea 1 time. Pt denies fever Pt would like to know if she is to be retreated?

## 2021-07-23 NOTE — TELEPHONE ENCOUNTER
----- Message from Frankie Frederick CNM sent at 7/23/2021  2:36 PM CDT -----  HIV testing and other testing negative

## 2021-12-28 ENCOUNTER — TELEPHONE (OUTPATIENT)
Dept: FAMILY MEDICINE CLINIC | Facility: CLINIC | Age: 26
End: 2021-12-28

## 2021-12-29 NOTE — TELEPHONE ENCOUNTER
Unable to email, we can print and have ready for  or mail to address on file.    Left message for patient to return phone call, see below

## 2021-12-30 NOTE — TELEPHONE ENCOUNTER
Pt called states she was not able provide TDAP information for her employer. Pt states employer needs her name and  with vaccine information. Informed Pt will send a letter through [address] with immunization record. Pt verbalized understanding. Pt to call back if she is not able to view letter.

## 2022-05-17 ENCOUNTER — NURSE TRIAGE (OUTPATIENT)
Dept: FAMILY MEDICINE CLINIC | Facility: CLINIC | Age: 27
End: 2022-05-17

## 2022-05-17 NOTE — TELEPHONE ENCOUNTER
Action Requested: Summary for Provider     []  Critical Lab, Recommendations Needed  [] Need Additional Advice  []   FYI    []   Need Orders  [] Need Medications Sent to Pharmacy  []  Other     SUMMARY: pt will keep previously scheduled OV for 5/18/22    Pt states for three years has occasional sharp chest pain. Pt states nothing seems to trigger it \"it is just random\"    Pt denies chest pain lasting longer than 5 minutes, SOB, severe headache, blurred vision, arm/jaw/shoulder pain, weakness, dizziness. Pt will keep OV scheduled 5/18/22 with Dr Briseida Sweet and to ER if symptoms worsen.      Reason for call: Acute  Onset: Data Unavailable

## 2022-05-18 ENCOUNTER — LAB ENCOUNTER (OUTPATIENT)
Dept: LAB | Facility: HOSPITAL | Age: 27
End: 2022-05-18
Attending: FAMILY MEDICINE
Payer: MEDICAID

## 2022-05-18 ENCOUNTER — OFFICE VISIT (OUTPATIENT)
Dept: OBGYN CLINIC | Facility: CLINIC | Age: 27
End: 2022-05-18
Payer: MEDICAID

## 2022-05-18 ENCOUNTER — OFFICE VISIT (OUTPATIENT)
Dept: FAMILY MEDICINE CLINIC | Facility: CLINIC | Age: 27
End: 2022-05-18
Payer: MEDICAID

## 2022-05-18 ENCOUNTER — LAB ENCOUNTER (OUTPATIENT)
Dept: LAB | Age: 27
End: 2022-05-18
Attending: FAMILY MEDICINE
Payer: MEDICAID

## 2022-05-18 VITALS
SYSTOLIC BLOOD PRESSURE: 120 MMHG | WEIGHT: 204 LBS | HEART RATE: 83 BPM | DIASTOLIC BLOOD PRESSURE: 67 MMHG | RESPIRATION RATE: 20 BRPM | BODY MASS INDEX: 32.02 KG/M2 | HEIGHT: 67 IN

## 2022-05-18 VITALS
SYSTOLIC BLOOD PRESSURE: 122 MMHG | DIASTOLIC BLOOD PRESSURE: 72 MMHG | WEIGHT: 203 LBS | BODY MASS INDEX: 32 KG/M2 | HEART RATE: 84 BPM

## 2022-05-18 DIAGNOSIS — R07.82 INTERCOSTAL PAIN: Primary | ICD-10-CM

## 2022-05-18 DIAGNOSIS — Z01.419 ENCOUNTER FOR ANNUAL ROUTINE GYNECOLOGICAL EXAMINATION: Primary | ICD-10-CM

## 2022-05-18 DIAGNOSIS — R07.82 INTERCOSTAL PAIN: ICD-10-CM

## 2022-05-18 DIAGNOSIS — Z12.4 SCREENING FOR MALIGNANT NEOPLASM OF CERVIX: ICD-10-CM

## 2022-05-18 DIAGNOSIS — A74.9 CHLAMYDIA: ICD-10-CM

## 2022-05-18 DIAGNOSIS — Z11.3 ROUTINE SCREENING FOR STI (SEXUALLY TRANSMITTED INFECTION): ICD-10-CM

## 2022-05-18 LAB
HBV SURFACE AG SER-ACNC: 0.31 [IU]/L
HBV SURFACE AG SERPL QL IA: NONREACTIVE
HCV AB SERPL QL IA: NONREACTIVE

## 2022-05-18 PROCEDURE — 99395 PREV VISIT EST AGE 18-39: CPT | Performed by: ADVANCED PRACTICE MIDWIFE

## 2022-05-18 PROCEDURE — 86803 HEPATITIS C AB TEST: CPT

## 2022-05-18 PROCEDURE — 93010 ELECTROCARDIOGRAM REPORT: CPT | Performed by: FAMILY MEDICINE

## 2022-05-18 PROCEDURE — 87340 HEPATITIS B SURFACE AG IA: CPT

## 2022-05-18 PROCEDURE — 3074F SYST BP LT 130 MM HG: CPT | Performed by: ADVANCED PRACTICE MIDWIFE

## 2022-05-18 PROCEDURE — 99213 OFFICE O/P EST LOW 20 MIN: CPT | Performed by: FAMILY MEDICINE

## 2022-05-18 PROCEDURE — 87491 CHLMYD TRACH DNA AMP PROBE: CPT

## 2022-05-18 PROCEDURE — 87389 HIV-1 AG W/HIV-1&-2 AB AG IA: CPT

## 2022-05-18 PROCEDURE — 86780 TREPONEMA PALLIDUM: CPT

## 2022-05-18 PROCEDURE — 36415 COLL VENOUS BLD VENIPUNCTURE: CPT

## 2022-05-18 PROCEDURE — 3074F SYST BP LT 130 MM HG: CPT | Performed by: FAMILY MEDICINE

## 2022-05-18 PROCEDURE — 93005 ELECTROCARDIOGRAM TRACING: CPT

## 2022-05-18 PROCEDURE — 3078F DIAST BP <80 MM HG: CPT | Performed by: FAMILY MEDICINE

## 2022-05-18 PROCEDURE — 3008F BODY MASS INDEX DOCD: CPT | Performed by: FAMILY MEDICINE

## 2022-05-18 PROCEDURE — 87591 N.GONORRHOEAE DNA AMP PROB: CPT

## 2022-05-18 PROCEDURE — 3078F DIAST BP <80 MM HG: CPT | Performed by: ADVANCED PRACTICE MIDWIFE

## 2022-05-18 RX ORDER — METHYLPREDNISOLONE 4 MG/1
TABLET ORAL
Qty: 21 EACH | Refills: 0 | Status: SHIPPED | OUTPATIENT
Start: 2022-05-18

## 2022-05-19 ENCOUNTER — TELEPHONE (OUTPATIENT)
Dept: OBGYN CLINIC | Facility: CLINIC | Age: 27
End: 2022-05-19

## 2022-05-19 LAB
C TRACH DNA SPEC QL NAA+PROBE: NEGATIVE
N GONORRHOEA DNA SPEC QL NAA+PROBE: NEGATIVE

## 2022-05-19 NOTE — TELEPHONE ENCOUNTER
----- Message from Belén Zhang CNM sent at 5/19/2022  8:33 AM CDT -----  HIV and other blood testing for STI is negative

## 2022-05-20 LAB
C TRACH DNA SPEC QL NAA+PROBE: NEGATIVE
N GONORRHOEA DNA SPEC QL NAA+PROBE: NEGATIVE
T PALLIDUM AB SER QL: NEGATIVE
T VAGINALIS RRNA SPEC QL NAA+PROBE: NEGATIVE

## 2022-05-28 NOTE — TELEPHONE ENCOUNTER
Spoke with pt and advised HIV and other blood testing for STI is negative per MES. Pt agreed and voiced understanding.

## 2023-05-09 ENCOUNTER — OFFICE VISIT (OUTPATIENT)
Dept: OBGYN CLINIC | Facility: CLINIC | Age: 28
End: 2023-05-09

## 2023-05-09 VITALS — SYSTOLIC BLOOD PRESSURE: 128 MMHG | HEART RATE: 106 BPM | DIASTOLIC BLOOD PRESSURE: 88 MMHG

## 2023-05-09 DIAGNOSIS — Z30.46 ENCOUNTER FOR REMOVAL OF SUBDERMAL CONTRACEPTIVE IMPLANT: Primary | ICD-10-CM

## 2023-05-09 PROCEDURE — 11982 REMOVE DRUG IMPLANT DEVICE: CPT | Performed by: ADVANCED PRACTICE MIDWIFE

## 2023-05-09 PROCEDURE — 3074F SYST BP LT 130 MM HG: CPT | Performed by: ADVANCED PRACTICE MIDWIFE

## 2023-05-09 PROCEDURE — 3079F DIAST BP 80-89 MM HG: CPT | Performed by: ADVANCED PRACTICE MIDWIFE

## 2023-05-09 NOTE — PROCEDURES
Pregnancy Results: negative from urine test   Birth control method(s) used:  ; date last used:   nexplanon  Consent was obtained from the patient. Desires pregnancy    Removal:  1 % lidocaine  was injected underneath the tip of the Nexplanon debbie that is closest to the left elbow. Nexplanon was located by palpation. 2 mm incision was made at the tip of the debbie closest to the elbow. Nexplanon was pushed toward the incision. Nexplanon edbbie was bluntly dissected from the tissue sheath. The entire Nexplanon debbie was removed. Visit Plan:  Steri-Strips were applied to the skin incision. An Ace bandage was wrapped around the injected arm. Patient was instructed to remove Ace bandage in 24 hours. Patient was instructed to remove Steri-Strips in 7 days. All of the patient's questions were addressed.

## 2023-07-19 ENCOUNTER — OFFICE VISIT (OUTPATIENT)
Dept: OBGYN CLINIC | Facility: CLINIC | Age: 28
End: 2023-07-19

## 2023-07-19 VITALS
SYSTOLIC BLOOD PRESSURE: 135 MMHG | DIASTOLIC BLOOD PRESSURE: 78 MMHG | BODY MASS INDEX: 36.73 KG/M2 | WEIGHT: 234 LBS | HEART RATE: 89 BPM | HEIGHT: 67 IN

## 2023-07-19 DIAGNOSIS — Z32.01 PREGNANCY EXAMINATION OR TEST, POSITIVE RESULT: ICD-10-CM

## 2023-07-19 DIAGNOSIS — N92.6 MISSED MENSES: Primary | ICD-10-CM

## 2023-07-19 LAB
CONTROL LINE PRESENT WITH A CLEAR BACKGROUND (YES/NO): YES YES/NO
KIT LOT #: NORMAL NUMERIC
PREGNANCY TEST, URINE: POSITIVE

## 2023-07-19 PROCEDURE — 3008F BODY MASS INDEX DOCD: CPT | Performed by: ADVANCED PRACTICE MIDWIFE

## 2023-07-19 PROCEDURE — 3075F SYST BP GE 130 - 139MM HG: CPT | Performed by: ADVANCED PRACTICE MIDWIFE

## 2023-07-19 PROCEDURE — 99214 OFFICE O/P EST MOD 30 MIN: CPT | Performed by: ADVANCED PRACTICE MIDWIFE

## 2023-07-19 PROCEDURE — 81025 URINE PREGNANCY TEST: CPT | Performed by: ADVANCED PRACTICE MIDWIFE

## 2023-07-19 PROCEDURE — 3078F DIAST BP <80 MM HG: CPT | Performed by: ADVANCED PRACTICE MIDWIFE

## 2023-07-19 RX ORDER — PNV NO.95/FERROUS FUM/FOLIC AC 28MG-0.8MG
1 TABLET ORAL DAILY
Qty: 90 TABLET | Refills: 5 | Status: SHIPPED | OUTPATIENT
Start: 2023-07-19 | End: 2023-08-18

## 2023-07-19 NOTE — PROGRESS NOTES
Subjective:   Patient ID: Luana Valentin is a 33year old female. ANGELES Vizcainojuan c Meeks is a 33yo  at 5w6d here for a missed menses visit. She had a + home pregnancy test and has sore breasts and occasional mild pelvic cramping, none today. Denies vaginal bleeding or severe pain. Has a history of two normal vaginal deliveries, denies history of preeclampsia or gestational diabetes. History/Other:   Review of Systems   Constitutional:  Negative for chills, fatigue and fever. Gastrointestinal:  Negative for abdominal pain, nausea and vomiting. Genitourinary:  Positive for pelvic pain (mild, occasional). Negative for dysuria, menstrual problem, vaginal bleeding, vaginal discharge and vaginal pain. Psychiatric/Behavioral:  The patient is not nervous/anxious. Current Outpatient Medications   Medication Sig Dispense Refill    methylPREDNISolone (MEDROL) 4 MG Oral Tablet Therapy Pack As directed. 21 each 0    Multiple Vitamins-Minerals (MULTIVITAL OR) Take by mouth. ferrous sulfate 325 (65 FE) MG Oral Tab EC Take 325 mg by mouth daily with breakfast. (Patient not taking: Reported on 2022)       Allergies:No Known Allergies    Objective:   Physical Exam  Vitals reviewed. Constitutional:       Appearance: Normal appearance. Cardiovascular:      Rate and Rhythm: Normal rate. Pulmonary:      Effort: Pulmonary effort is normal.   Abdominal:      General: Abdomen is flat. Palpations: Abdomen is soft. Neurological:      General: No focal deficit present. Mental Status: She is alert and oriented to person, place, and time. Assessment & Plan:   Missed menses  (primary encounter diagnosis)  Pregnancy examination or test, positive result    Orders Placed This Encounter      Urine Pregnancy Test    Diagnoses and all orders for this visit:    Missed menses  -     URINE PREGNANCY TEST    Pregnancy examination or test, positive result      1.   Discussed importance of folic acid, calcium, vitamin D.   2.  Reviewed pregnancy recommendations regarding weight gain, diet, fish consumption, consumption of deli-meats and hot dogs, making sure food is appropriately cooked and washed to avoid food-borne illnesses. 3.  Travel discussed, avoid travel to Covid zones, use of support stockings with flights longer than 3 hours, movement every 2-3 hours if driving  4. Discussed exercise, avoid jacuzzi, sauna, hot tubs and steam rooms. 5.  Discussed avoidance of alcohol, smoking, and minimizing caffeine. 6.  Warning signs reviewed advised to call office if occur. 7.  First Trimester chromosomal screening Cell free Fetal DNA and US schedule discussed. Considering First trimester screen testing  8. Genetic screening and ACOG/ ACMG guidelines for CF, SMA, Fragile X & hemoglobinopathies. Informrd our offices uses the Preparent Standard Panel but other sources are available. If interested they should check their insurance and various costs  9. ONTD risks discussed. No risk factors identified. 10. Pre- Eclampsia Risk Assessment:   Moderate risk factors. 2 :   -Obesity  - Socioeconomic characteristics AA race  Recommend to start baby/low dose ASA in the 12th-13th week of gestation  6. Schedule RN OB ED visit   12.  30 minutes face to face counseling, chart review, orders and coordination of care    TYRON Hernandez under direct supervision of Monie Shaikh CNM. I personally witnessed the patient's exam and medical decision making on this date of service. I was physically present in the room for the performance of the E/M service. I have reviewed the RAMSEY student's documentation and findings including history, Exam, and Medical Decision Making, edited the document for accuracy and verify that it represents the clinical findings and services performed.        Meds This Visit:  Requested Prescriptions     Pending Prescriptions Disp Refills    Prenatal Vit-Fe Fumarate-FA (PRENATAL VITAMIN) 27-0.8 MG Oral Tab 90 tablet 5     Sig: Take 1 tablet by mouth daily.        Imaging & Referrals:  None

## 2023-07-19 NOTE — PROGRESS NOTES
I personally witnessed the patient's exam and medical decision making on this date of service. I was physically present in the room for the performance of the E/M service. I have reviewed the CNM student's documentation and findings including history, Exam, and Medical Decision Making, edited the document for accuracy and verify that it represents the clinical findings and services performed. Diagnoses and all orders for this visit:    Missed menses  -     URINE PREGNANCY TEST    Pregnancy examination or test, positive result    Other orders  -     Prenatal Vit-Fe Fumarate-FA (PRENATAL VITAMIN) 27-0.8 MG Oral Tab; Take 1 tablet by mouth daily.

## 2023-08-14 ENCOUNTER — TELEPHONE (OUTPATIENT)
Dept: OBGYN CLINIC | Facility: CLINIC | Age: 28
End: 2023-08-14

## 2023-08-14 ENCOUNTER — NURSE ONLY (OUTPATIENT)
Dept: OBGYN CLINIC | Facility: CLINIC | Age: 28
End: 2023-08-14

## 2023-08-14 DIAGNOSIS — O99.210 OBESITY AFFECTING PREGNANCY, ANTEPARTUM, UNSPECIFIED OBESITY TYPE: ICD-10-CM

## 2023-08-14 DIAGNOSIS — Z34.81 ENCOUNTER FOR SUPERVISION OF OTHER NORMAL PREGNANCY IN FIRST TRIMESTER: Primary | ICD-10-CM

## 2023-08-14 NOTE — PROGRESS NOTES
Phone Nurse Education Completed  PT verbalized understanding     Orders placed for new OB labs       Last Pap 2022     Genetic Testing- Undecided  Circumcision- Desires  Feeding- Breast and Bottle  Transfusion-  Agreeable  EPDS- 0  Type of birth- Desires no epidural.      Pt. Has answered NO 5P questions and has NO  risk factors. Pt. Given What pregnant women need to know handout.       Desires Nutrition Consult  BMI-32

## 2023-09-08 ENCOUNTER — LAB ENCOUNTER (OUTPATIENT)
Dept: LAB | Facility: HOSPITAL | Age: 28
End: 2023-09-08
Attending: ADVANCED PRACTICE MIDWIFE
Payer: MEDICAID

## 2023-09-08 DIAGNOSIS — Z34.81 ENCOUNTER FOR SUPERVISION OF OTHER NORMAL PREGNANCY IN FIRST TRIMESTER: ICD-10-CM

## 2023-09-08 LAB
ANTIBODY SCREEN: NEGATIVE
BASOPHILS # BLD AUTO: 0.03 X10(3) UL (ref 0–0.2)
BASOPHILS NFR BLD AUTO: 0.3 %
DEPRECATED RDW RBC AUTO: 41.2 FL (ref 35.1–46.3)
EOSINOPHIL # BLD AUTO: 0.16 X10(3) UL (ref 0–0.7)
EOSINOPHIL NFR BLD AUTO: 1.4 %
ERYTHROCYTE [DISTWIDTH] IN BLOOD BY AUTOMATED COUNT: 13.8 % (ref 11–15)
EST. AVERAGE GLUCOSE BLD GHB EST-MCNC: 117 MG/DL (ref 68–126)
HBA1C MFR BLD: 5.7 % (ref ?–5.7)
HBV SURFACE AG SER-ACNC: <0.1 [IU]/L
HBV SURFACE AG SERPL QL IA: NONREACTIVE
HCT VFR BLD AUTO: 36.6 %
HCV AB SERPL QL IA: NONREACTIVE
HGB BLD-MCNC: 12.2 G/DL
IMM GRANULOCYTES # BLD AUTO: 0.06 X10(3) UL (ref 0–1)
IMM GRANULOCYTES NFR BLD: 0.5 %
LYMPHOCYTES # BLD AUTO: 2.69 X10(3) UL (ref 1–4)
LYMPHOCYTES NFR BLD AUTO: 24.1 %
MCH RBC QN AUTO: 27.5 PG (ref 26–34)
MCHC RBC AUTO-ENTMCNC: 33.3 G/DL (ref 31–37)
MCV RBC AUTO: 82.4 FL
MONOCYTES # BLD AUTO: 1.04 X10(3) UL (ref 0.1–1)
MONOCYTES NFR BLD AUTO: 9.3 %
NEUTROPHILS # BLD AUTO: 7.19 X10 (3) UL (ref 1.5–7.7)
NEUTROPHILS # BLD AUTO: 7.19 X10(3) UL (ref 1.5–7.7)
NEUTROPHILS NFR BLD AUTO: 64.4 %
PLATELET # BLD AUTO: 243 10(3)UL (ref 150–450)
RBC # BLD AUTO: 4.44 X10(6)UL
RH BLOOD TYPE: POSITIVE
RUBV IGG SER QL: POSITIVE
RUBV IGG SER-ACNC: 38.7 IU/ML (ref 10–?)
T PALLIDUM AB SER QL: NEGATIVE
VZV IGG SER IA-ACNC: 579.3 (ref 165–?)
WBC # BLD AUTO: 11.2 X10(3) UL (ref 4–11)

## 2023-09-08 PROCEDURE — 86778 TOXOPLASMA ANTIBODY IGM: CPT

## 2023-09-08 PROCEDURE — 86780 TREPONEMA PALLIDUM: CPT

## 2023-09-08 PROCEDURE — 86762 RUBELLA ANTIBODY: CPT

## 2023-09-08 PROCEDURE — 36415 COLL VENOUS BLD VENIPUNCTURE: CPT

## 2023-09-08 PROCEDURE — 86900 BLOOD TYPING SEROLOGIC ABO: CPT

## 2023-09-08 PROCEDURE — 86901 BLOOD TYPING SEROLOGIC RH(D): CPT

## 2023-09-08 PROCEDURE — 83036 HEMOGLOBIN GLYCOSYLATED A1C: CPT

## 2023-09-08 PROCEDURE — 86787 VARICELLA-ZOSTER ANTIBODY: CPT

## 2023-09-08 PROCEDURE — 86777 TOXOPLASMA ANTIBODY: CPT

## 2023-09-08 PROCEDURE — 85025 COMPLETE CBC W/AUTO DIFF WBC: CPT

## 2023-09-08 PROCEDURE — 87086 URINE CULTURE/COLONY COUNT: CPT

## 2023-09-08 PROCEDURE — 86803 HEPATITIS C AB TEST: CPT

## 2023-09-08 PROCEDURE — 87389 HIV-1 AG W/HIV-1&-2 AB AG IA: CPT

## 2023-09-08 PROCEDURE — 83020 HEMOGLOBIN ELECTROPHORESIS: CPT

## 2023-09-08 PROCEDURE — 83021 HEMOGLOBIN CHROMOTOGRAPHY: CPT

## 2023-09-08 PROCEDURE — 87340 HEPATITIS B SURFACE AG IA: CPT

## 2023-09-08 PROCEDURE — 86850 RBC ANTIBODY SCREEN: CPT

## 2023-09-09 LAB
TOXO GONDII IGG AB: <3 IU/ML
TOXO GONDII IGM: 3.1 AU/ML

## 2023-09-12 ENCOUNTER — INITIAL PRENATAL (OUTPATIENT)
Dept: OBGYN CLINIC | Facility: CLINIC | Age: 28
End: 2023-09-12

## 2023-09-12 ENCOUNTER — TELEPHONE (OUTPATIENT)
Dept: PERINATAL CARE | Facility: HOSPITAL | Age: 28
End: 2023-09-12

## 2023-09-12 VITALS
DIASTOLIC BLOOD PRESSURE: 85 MMHG | WEIGHT: 238 LBS | BODY MASS INDEX: 37 KG/M2 | HEART RATE: 106 BPM | SYSTOLIC BLOOD PRESSURE: 145 MMHG

## 2023-09-12 DIAGNOSIS — R03.0 ELEVATED BLOOD PRESSURE READING IN OFFICE WITHOUT DIAGNOSIS OF HYPERTENSION: ICD-10-CM

## 2023-09-12 DIAGNOSIS — E74.39 GLUCOSE INTOLERANCE: ICD-10-CM

## 2023-09-12 DIAGNOSIS — Z34.81 ENCOUNTER FOR SUPERVISION OF OTHER NORMAL PREGNANCY IN FIRST TRIMESTER: Primary | ICD-10-CM

## 2023-09-12 DIAGNOSIS — O99.210 OBESITY IN PREGNANCY: ICD-10-CM

## 2023-09-12 PROBLEM — R73.03 PRE-DIABETES: Status: ACTIVE | Noted: 2023-09-12

## 2023-09-12 PROCEDURE — 3079F DIAST BP 80-89 MM HG: CPT | Performed by: ADVANCED PRACTICE MIDWIFE

## 2023-09-12 PROCEDURE — 3077F SYST BP >= 140 MM HG: CPT | Performed by: ADVANCED PRACTICE MIDWIFE

## 2023-09-12 PROCEDURE — 0500F INITIAL PRENATAL CARE VISIT: CPT | Performed by: ADVANCED PRACTICE MIDWIFE

## 2023-09-12 RX ORDER — ASPIRIN 81 MG/1
120 TABLET, CHEWABLE ORAL DAILY
Qty: 60 TABLET | Refills: 2 | Status: SHIPPED | OUTPATIENT
Start: 2023-09-12

## 2023-09-12 RX ORDER — ADHESIVE BANDAGE 3/4"
BANDAGE TOPICAL
Qty: 1 EACH | Refills: 0 | Status: SHIPPED | OUTPATIENT
Start: 2023-09-12

## 2023-09-13 ENCOUNTER — PATIENT MESSAGE (OUTPATIENT)
Dept: OBGYN CLINIC | Facility: CLINIC | Age: 28
End: 2023-09-13

## 2023-09-13 LAB
HGB A2 MFR BLD: 2.7 % (ref 1.5–3.5)
HGB PNL BLD ELPH: 97.3 % (ref 95.5–100)

## 2023-09-14 ENCOUNTER — TELEPHONE (OUTPATIENT)
Dept: OBGYN CLINIC | Facility: CLINIC | Age: 28
End: 2023-09-14

## 2023-09-14 ENCOUNTER — LABORATORY ENCOUNTER (OUTPATIENT)
Dept: LAB | Facility: HOSPITAL | Age: 28
End: 2023-09-14
Attending: ADVANCED PRACTICE MIDWIFE
Payer: MEDICAID

## 2023-09-14 DIAGNOSIS — R03.0 ELEVATED BLOOD PRESSURE READING IN OFFICE WITHOUT DIAGNOSIS OF HYPERTENSION: ICD-10-CM

## 2023-09-14 DIAGNOSIS — E74.39 GLUCOSE INTOLERANCE: ICD-10-CM

## 2023-09-14 DIAGNOSIS — O24.419 GESTATIONAL DIABETES MELLITUS (GDM), ANTEPARTUM, GESTATIONAL DIABETES METHOD OF CONTROL UNSPECIFIED: Primary | ICD-10-CM

## 2023-09-14 LAB
ALBUMIN SERPL-MCNC: 3.1 G/DL (ref 3.4–5)
ALBUMIN/GLOB SERPL: 0.8 {RATIO} (ref 1–2)
ALP LIVER SERPL-CCNC: 53 U/L
ALT SERPL-CCNC: 31 U/L
ANION GAP SERPL CALC-SCNC: 8 MMOL/L (ref 0–18)
AST SERPL-CCNC: 28 U/L (ref 15–37)
BILIRUB SERPL-MCNC: 0.4 MG/DL (ref 0.1–2)
BUN BLD-MCNC: 4 MG/DL (ref 7–18)
BUN/CREAT SERPL: 6.2 (ref 10–20)
CALCIUM BLD-MCNC: 9 MG/DL (ref 8.5–10.1)
CHLORIDE SERPL-SCNC: 106 MMOL/L (ref 98–112)
CO2 SERPL-SCNC: 24 MMOL/L (ref 21–32)
CREAT BLD-MCNC: 0.65 MG/DL
EGFRCR SERPLBLD CKD-EPI 2021: 122 ML/MIN/1.73M2 (ref 60–?)
FASTING STATUS PATIENT QL REPORTED: YES
GLOBULIN PLAS-MCNC: 4.1 G/DL (ref 2.8–4.4)
GLUCOSE 1H P GLC SERPL-MCNC: 195 MG/DL
GLUCOSE 2H P GLC SERPL-MCNC: 179 MG/DL
GLUCOSE 3H P GLC SERPL-MCNC: 124 MG/DL (ref 70–140)
GLUCOSE BLD-MCNC: 195 MG/DL (ref 70–99)
GLUCOSE P FAST SERPL-MCNC: 96 MG/DL
OSMOLALITY SERPL CALC.SUM OF ELEC: 288 MOSM/KG (ref 275–295)
POTASSIUM SERPL-SCNC: 3.7 MMOL/L (ref 3.5–5.1)
PROT SERPL-MCNC: 7.2 G/DL (ref 6.4–8.2)
SODIUM SERPL-SCNC: 138 MMOL/L (ref 136–145)

## 2023-09-14 PROCEDURE — 36415 COLL VENOUS BLD VENIPUNCTURE: CPT

## 2023-09-14 PROCEDURE — 82952 GTT-ADDED SAMPLES: CPT

## 2023-09-14 PROCEDURE — 80053 COMPREHEN METABOLIC PANEL: CPT

## 2023-09-14 PROCEDURE — 82951 GLUCOSE TOLERANCE TEST (GTT): CPT

## 2023-09-15 PROBLEM — R73.03 PRE-DIABETES: Status: RESOLVED | Noted: 2023-09-12 | Resolved: 2023-09-15

## 2023-09-15 PROBLEM — O24.410 DIET CONTROLLED GESTATIONAL DIABETES MELLITUS (GDM) IN SECOND TRIMESTER (HCC): Status: ACTIVE | Noted: 2023-09-15

## 2023-09-15 PROBLEM — O24.410 DIET CONTROLLED GESTATIONAL DIABETES MELLITUS (GDM) IN SECOND TRIMESTER: Status: ACTIVE | Noted: 2023-09-15

## 2023-09-20 ENCOUNTER — TELEPHONE (OUTPATIENT)
Dept: OBGYN CLINIC | Facility: CLINIC | Age: 28
End: 2023-09-20

## 2023-09-20 DIAGNOSIS — O10.919 CHRONIC HYPERTENSION AFFECTING PREGNANCY: ICD-10-CM

## 2023-09-20 DIAGNOSIS — O24.912 DIABETES MELLITUS DURING PREGNANCY IN SECOND TRIMESTER, UNSPECIFIED DIABETES MELLITUS TYPE: ICD-10-CM

## 2023-09-20 DIAGNOSIS — Z91.89 AT RISK FOR ANEUPLOIDY: Primary | ICD-10-CM

## 2023-09-20 NOTE — TELEPHONE ENCOUNTER
PC to patient. Discussed resutls of NIPT with low fetal fraction. Results came back low fetal fraction, high risk aneuploidy. This could be just did not get enough sample but could also be due to aneuploidy. Recommend f/u with MFM for ultrasound and then depending on findings on ultrasound either repeat CFDNA or proceed with option of amnio which is diagnostic. She agrees with plan. Also discussed that I reviewed her b/p's and see that they are borderline elevated. This along with the early dx of DM in pregnancy, she risks out of CNM care. Will transfer her to MD see. Will place referral and send message to their RN in office to get her set up for appt with MD. Katalina Gutierrez with plan. She should call us if she has issues getting in with MFM so we can facilitate.

## 2023-09-20 NOTE — TELEPHONE ENCOUNTER
Out going call to [facility]. Spoke to [employer] a genetic counselor. \" Fetal fraction was 1.6 which is below cutoff of 2.8%. Ran secondary statistical analysis based on weight, age, and fetal fraction. Based on the information they received, they can place the patient in 1 of 2 categories. Patient was placed in high risk category, because her factors point towards genetic abnormality to explain the low fetal fraction. \"     They consider this true high risk. Patient can either have Lorelei test reran, or she may proceed with genetic testing and further diagnostic testing.

## 2023-09-20 NOTE — TELEPHONE ENCOUNTER
Fax not yet received. Results not yet available in [facility]. MJ sent secure chat that awaiting fax.

## 2023-09-20 NOTE — TELEPHONE ENCOUNTER
Incoming Fax from [facility]. Result- High Risk due to fetal DNA fraction.    Fetal Sex- N/A  Fetal Fraction 1.6%    Result Details Aneuploidies-  Trisomy 21- No Result  Monosomy- No result  Triploidy, Trisomy 25, Trisomy 15- High Risk    22q11.2 Deletion Syndrome- No result

## 2023-09-21 ENCOUNTER — TELEPHONE (OUTPATIENT)
Dept: OBGYN CLINIC | Facility: CLINIC | Age: 28
End: 2023-09-21

## 2023-09-21 NOTE — TELEPHONE ENCOUNTER
LMTCB. Pt needs a SHELL appt (30 min) with any of our doctors asap.       ----- Message from Matty Mejia CNM sent at 2023  5:53 PM CDT -----  Pt is  @ 14 wks. She failed early 3hr. Also with Hypertension. I have her going for diabetic education and also going to see MFM as her NIPT came back with low fetal fraction, high risk for aneuploidy. She needs to be transferred to MD tucker. Please get her in for an appointment with MD's .  Thanks MJ

## 2023-09-22 ENCOUNTER — HOSPITAL ENCOUNTER (OUTPATIENT)
Dept: ENDOCRINOLOGY | Facility: HOSPITAL | Age: 28
Discharge: HOME | End: 2023-09-22
Attending: ADVANCED PRACTICE MIDWIFE
Payer: MEDICAID

## 2023-09-22 VITALS — WEIGHT: 238.5 LBS | BODY MASS INDEX: 37 KG/M2

## 2023-09-22 DIAGNOSIS — O24.419 GESTATIONAL DIABETES MELLITUS (GDM), ANTEPARTUM, GESTATIONAL DIABETES METHOD OF CONTROL UNSPECIFIED: Primary | ICD-10-CM

## 2023-09-22 RX ORDER — BLOOD-GLUCOSE METER
1 EACH MISCELLANEOUS 4 TIMES DAILY
Qty: 1 KIT | Refills: 0 | Status: SHIPPED | OUTPATIENT
Start: 2023-09-22

## 2023-09-22 RX ORDER — BLOOD SUGAR DIAGNOSTIC
1 STRIP MISCELLANEOUS 4 TIMES DAILY
Qty: 100 STRIP | Refills: 2 | Status: SHIPPED | OUTPATIENT
Start: 2023-09-22

## 2023-09-22 RX ORDER — LANCETS 33 GAUGE
1 EACH MISCELLANEOUS DAILY
Qty: 100 EACH | Refills: 2 | Status: SHIPPED | OUTPATIENT
Start: 2023-09-22 | End: 2024-09-21

## 2023-09-22 NOTE — PROGRESS NOTES
Lito Durant  : 1995 was seen for Gestational Diabetes Counseling: Individual/Group    Date: 2023   Start time: 9:45 am  End time: 11:45 am    Obtained usual diet history: eats 3 meals a day with 2-3 snacks. Drinks a cup of coffee a day and eats sweets/pastries once a day. Education:     GDM Overview:  Reviewed gestational diabetes as diagnosis including target blood glucose values. Benefits, risks, and management options for improving/maintaining glucose control to mother/baby discussed. Instructed /demonstrated ability to perform blood glucose testing on: contour next      Healthy Eating:  Discussed nutrition concepts for pregnancy/healthy eating and effects of food on BG value. Timing of meals; what is a carbohydrate, protein, fat. Taught: Carb counting, label reading, meal planning. Suggested Calorie Level: 2000    Being Active:  Benefits and effects of activity on BG discussed. Monitoring:  Instructed on how to use glucose monitor/proper lancet disposal. Testing schedules are:   Fastin-95 mg/dL, Call MD if >95 mg/dL twice in 1 week   2 Hour Post Prandial:  120 md/dL, Call MD if >120 mg/dL twice in 1 week. Taking Medication:  Reviewed when medication might be indicated. Reducing Risk:  Effects of elevated blood glucose on mother/baby reviewed. Discussed management (hyperglycemia, hypoglycemia, sick day, DKA, other) and when to call provider. Post pregnancy management/prevention of Type 2 DM, and increased risk of having diabetes later in life reviewed. Healthy Coping:  Family involvement/social support encouraged. Identification of lifestyle behaviors willing to change discussed. Training Tools Provided:  Printed materials covering each topic provided. Support Plan provided and reviewed. Patient Chosen Goals:      1. Follow recommended GDM meal plan. 2. Begin testing fasting glucose and 2 hours after meals   3.  Bring glucose log to each MD office visit.   4. Encouraged activity if no restrictions. 5. Encouraged Adalgisazo Chung to call diabetes center with any questions or concerns. Patient verbalized understanding and has no further questions at this time.     Jesenia Solorzano RN

## 2023-09-28 ENCOUNTER — LAB ENCOUNTER (OUTPATIENT)
Dept: LAB | Facility: HOSPITAL | Age: 28
End: 2023-09-28
Attending: OBSTETRICS & GYNECOLOGY
Payer: MEDICAID

## 2023-09-28 ENCOUNTER — ROUTINE PRENATAL (OUTPATIENT)
Dept: OBGYN CLINIC | Facility: CLINIC | Age: 28
End: 2023-09-28
Payer: MEDICAID

## 2023-09-28 VITALS
DIASTOLIC BLOOD PRESSURE: 76 MMHG | WEIGHT: 236.63 LBS | HEIGHT: 67 IN | BODY MASS INDEX: 37.14 KG/M2 | SYSTOLIC BLOOD PRESSURE: 132 MMHG

## 2023-09-28 DIAGNOSIS — Z3A.16 16 WEEKS GESTATION OF PREGNANCY: ICD-10-CM

## 2023-09-28 DIAGNOSIS — O28.5 ABNORMAL GENETIC TEST DURING PREGNANCY: ICD-10-CM

## 2023-09-28 DIAGNOSIS — O10.019 BENIGN ESSENTIAL HYPERTENSION, ANTEPARTUM: ICD-10-CM

## 2023-09-28 DIAGNOSIS — O10.019 BENIGN ESSENTIAL HYPERTENSION, ANTEPARTUM: Primary | ICD-10-CM

## 2023-09-28 DIAGNOSIS — O99.210 OBESITY IN PREGNANCY: ICD-10-CM

## 2023-09-28 DIAGNOSIS — O24.319 PREGESTATIONAL DIABETES MELLITUS, MODIFIED WHITE CLASS B: ICD-10-CM

## 2023-09-28 LAB
CREAT UR-SCNC: 41.9 MG/DL
PROT UR-MCNC: <5 MG/DL

## 2023-09-28 PROCEDURE — 84156 ASSAY OF PROTEIN URINE: CPT

## 2023-09-28 PROCEDURE — 0500F INITIAL PRENATAL CARE VISIT: CPT | Performed by: OBSTETRICS & GYNECOLOGY

## 2023-09-28 PROCEDURE — 82105 ALPHA-FETOPROTEIN SERUM: CPT

## 2023-09-28 PROCEDURE — 3078F DIAST BP <80 MM HG: CPT | Performed by: OBSTETRICS & GYNECOLOGY

## 2023-09-28 PROCEDURE — 3008F BODY MASS INDEX DOCD: CPT | Performed by: OBSTETRICS & GYNECOLOGY

## 2023-09-28 PROCEDURE — 3075F SYST BP GE 130 - 139MM HG: CPT | Performed by: OBSTETRICS & GYNECOLOGY

## 2023-09-28 PROCEDURE — 82570 ASSAY OF URINE CREATININE: CPT

## 2023-09-28 PROCEDURE — 36415 COLL VENOUS BLD VENIPUNCTURE: CPT

## 2023-09-28 NOTE — PROGRESS NOTES
[facility]  Obstetrics and Gynecology  History & Physical    CC: Patient is here to establish prenatal care     Subjective:     HPI:  Heaven Montague is a 33year old  female at 16w0d who presents today to establish prenatal care - transfer of care from [facility] for likely pregestational DM and HTN. Patient reports no c/o. Had uncomplicated pregancies in past.  Denies vaginal bleeding, abdominal/pelvic pain, nausea and vomiting. Started monitoring accuchecks last week -   Fastings ; 2 HR PP - 3 outliers 120-140 otherwise <120    LMP: Patient's last menstrual period was 2023 (exact date). DILMA:  Estimated Date of Delivery: 3/14/24    OB:  OB History    Para Term  AB Living   3 2 2     2   SAB IAB Ectopic Multiple Live Births           2      # Outcome Date GA Lbr Srinivasa/2nd Weight Sex Delivery Anes PTL Lv   3 Current            2 Term 16 38w1d  7 lb 5 oz (3.317 kg) F NORMAL SPONT None  JENSEN   1 Term 08/10/14 38w0d  5 lb 15 oz (2.693 kg) F NORMAL SPONT None N JENSEN         GYN:   Menarche: 11 (2023 10:18 AM)  Period Duration (Days): 7 (2023 10:18 AM)  Period Flow: heavy (2023 10:18 AM)  Use of Birth Control (if yes, specify type): None (2023  3:07 PM)  Hx Prior Abnormal Pap: Yes (2023 10:18 AM)  Pap Date: 18 (2023 10:18 AM)  Pap Result Notes: normal (2023 10:18 AM)    STD hx-. Hx of chlamydia s/p treatment   History of abnormal paps: No.    PMH:   Past Medical History:   Diagnosis Date    Anemia     Pre-diabetes     Early A1C 5.7. 3hr GTT ordered       PSH:  No past surgical history on file. MEDS:    Current Outpatient Medications:     aspirin (ASPIRIN LOW DOSE) 81 MG Oral Chew Tab, Chew 1.5 tablets (121.5 mg total) by mouth daily. , Disp: 60 tablet, Rfl: 2    Blood Glucose Monitoring Suppl ( S ) w/Device Does not apply Kit, 1 kit 4 (four) times daily. , Disp: 1 kit, Rfl: 0    Glucose Blood ([facility] Sour VERIO) In Vitro Strip, 1 strip 4 (four) times daily. Use daily as directed., Disp: 100 strip, Rfl: 2    OneTouch Delica Lancets 66U Does not apply Misc, 1 Lancet by Finger stick route daily. May substitute for covered brand only if prescribed brand os not covered. , Disp: 100 each, Rfl: 2    Blood Pressure Monitoring (BLOOD PRESSURE CUFF) Does not apply Misc, Take blood pressure once daily and record reading, Disp: 1 each, Rfl: 0    methylPREDNISolone (MEDROL) 4 MG Oral Tablet Therapy Pack, As directed., Disp: 21 each, Rfl: 0    Multiple Vitamins-Minerals (MULTIVITAL OR), Take by mouth., Disp: , Rfl:     Allergies:    No Known Allergies    Immunizations:    Immunization History  Administered            Date(s) Administered    DTP                   08/25/1994 12/01/1994 02/02/1995 02/08/1996      DTP/HIB               05/24/1999      HEP B                 06/30/1994 08/25/1994 07/28/1995      HIB                   08/25/1994 12/01/1994 02/02/1995      HPV (Gardasil)        07/15/2008  08/10/2012  06/25/2013      MMR                   07/28/1995  05/24/1999  04/18/2019                            11/15/2019      Meningococcal (Menomune)                          07/15/2008  08/01/2012      OPV                   08/25/1994 12/01/1994 02/02/1995 05/24/1999      TDAP                  07/15/2008  04/16/2019      Varicella             03/11/2003  07/15/2008      Family Hx:      Family History   Problem Relation Age of Onset    Thyroid disease Mother     Diabetes Maternal Grandmother     Diabetes Paternal Grandmother     Breast Cancer Paternal Grandmother 56        57    Hypertension Other         Family h/o Hypertension    Thyroid disease Other         Family h/o Thyroid Disease    Other (Other) Other         No Family h    Asthma Other         Family h/o Asthma    Diabetes Other         Family h/o Diabetes    Heart Disease Other         Family h/o Coronary Artery Disease    Glaucoma Neg        SocialHx:        Social History     Socioeconomic History    Marital status: Single   Tobacco Use    Smoking status: Never    Smokeless tobacco: Never   Vaping Use    Vaping Use: Never used   Substance and Sexual Activity    Alcohol use: No     Alcohol/week: 0.0 standard drinks of alcohol    Drug use: No    Sexual activity: Yes     Birth control/protection: Implant     Comment: Nexplanon 2018   Other Topics Concern    Caffeine Concern No    Special Diet No    Exercise Yes    Seat Belt Yes   Social Determinants of Health  Financial Resource Strain: Low Risk  (9/12/2023)      Financial Resource Strain          Difficulty of Paying Living Expenses: Not hard at all          Med Affordability: No  Food Insecurity: No Food Insecurity (9/12/2023)      Food Insecurity          Food Insecurity: Never true  Transportation Needs: No Transportation Needs (9/12/2023)      Transportation Needs          Lack of Transportation: No  Stress: No Stress Concern Present (9/12/2023)      Stress          Feeling of Stress : No  Housing Stability: Low Risk  (9/12/2023)      Housing Stability          Housing Instability: No        Occupation-     Review of Systems:  General: no complaints  Eyes: no complaints  Respiratory: no complaints  Cardiovascular: no complaints  GI: no complaints  : no complaints See HPI  Hematological/lymphatic: no complaints  Breast: no complaints  Psychiatric: no complaints  Endocrine:no complaints  Neurological: no complaints  Immunological: no complaints  Musculoskeletal:no complaints    Objective:     GENERAL: well developed, well nourished, in no apparent distress, alert and orientated X 3  PSYCH: mood and affect stable   SKIN: no rashes, no lesions  HEENT: normal  LUNGS: respiration unlabored  CARDIOVASCULAR: no peripheral edema or varicosities, skin warm and dry        EXTREMITIES:  Normal range of motion, strength 5/5, nontender without edema    Fetal Well Being Assessment:    Positive dopplers    Assessment/Plan:     Deisy Leigh is a 33year old  female at 16w0d who presents today to establish prenatal care. Patient Active Problem List:     Elevated blood pressure reading in office without diagnosis of hypertension     Diet controlled gestational diabetes mellitus (GDM) in second trimester      (O10.019) Benign essential hypertension, antepartum  (primary encounter diagnosis)  Plan: Urine Protein/Creatinine Ratio, Random, OB, EKG        12 Lead, OP REFERRAL TO  CONSULT    (O24.319) Pregestational diabetes mellitus, modified White class B  Plan: OP REFERRAL TO  CONSULT    (O28.5) Abnormal genetic test during pregnancy  Plan: OP REFERRAL TO  CONSULT    (Z3A.16) 16 weeks gestation of pregnancy  Plan: AFP, Maternal Serum    (O99.210) Obesity in pregnancy      Prenatal care  - prenatal labs reviewed  -continue baby ASA  - referred for GDM management with MFM and for US/consultation for abnormal FFDNA and CHTN. Advised may be offered amniocentesis. Hast appointment next week. May need insulin given elevated fasting sugars.    - briefly counseled on risks with GDM and CHTN in pregnancy  Declines flu vaccine - will offer next visit      RTC in 4 weeks     Luciana Nichols MD    [facility]

## 2023-09-30 ENCOUNTER — LAB ENCOUNTER (OUTPATIENT)
Dept: LAB | Facility: HOSPITAL | Age: 28
End: 2023-09-30
Attending: OBSTETRICS & GYNECOLOGY
Payer: MEDICAID

## 2023-09-30 DIAGNOSIS — O10.019 BENIGN ESSENTIAL HYPERTENSION, ANTEPARTUM: ICD-10-CM

## 2023-09-30 LAB
AFP MOM: 0.52
AFP VALUE: 14.6 NG/ML
GA ON COLL DATE: 16 WEEKS
INSULIN DEP AFP: NO
MAT AGE AT EDD: 29.7 YR
MULTIPLE GEST AFP: NO
OSBR RISK 1 IN AFP: NORMAL
WEIGHT AFP: 236 LBS

## 2023-09-30 PROCEDURE — 93010 ELECTROCARDIOGRAM REPORT: CPT | Performed by: INTERNAL MEDICINE

## 2023-09-30 PROCEDURE — 93005 ELECTROCARDIOGRAM TRACING: CPT

## 2023-10-01 LAB
ATRIAL RATE: 81 BPM
P AXIS: 57 DEGREES
P-R INTERVAL: 152 MS
Q-T INTERVAL: 378 MS
QRS DURATION: 86 MS
QTC CALCULATION (BEZET): 439 MS
R AXIS: 66 DEGREES
T AXIS: 34 DEGREES
VENTRICULAR RATE: 81 BPM

## 2023-10-02 ENCOUNTER — TELEPHONE (OUTPATIENT)
Dept: OBGYN CLINIC | Facility: CLINIC | Age: 28
End: 2023-10-02

## 2023-10-02 NOTE — TELEPHONE ENCOUNTER
Incoming call from [facility] to please fax Neli Norah report to [phone]. Copy from [employer] and faxed to requested number.

## 2023-10-03 ENCOUNTER — HOSPITAL ENCOUNTER (OUTPATIENT)
Dept: PERINATAL CARE | Facility: HOSPITAL | Age: 28
Discharge: HOME | End: 2023-10-03
Attending: ADVANCED PRACTICE MIDWIFE
Payer: MEDICAID

## 2023-10-03 ENCOUNTER — HOSPITAL ENCOUNTER (OUTPATIENT)
Dept: PERINATAL CARE | Facility: HOSPITAL | Age: 28
Discharge: HOME | End: 2023-10-03
Attending: OBSTETRICS & GYNECOLOGY
Payer: MEDICAID

## 2023-10-03 VITALS
HEART RATE: 88 BPM | DIASTOLIC BLOOD PRESSURE: 75 MMHG | BODY MASS INDEX: 37 KG/M2 | WEIGHT: 236 LBS | SYSTOLIC BLOOD PRESSURE: 136 MMHG

## 2023-10-03 DIAGNOSIS — O24.419 GDM, CLASS A2: Primary | ICD-10-CM

## 2023-10-03 DIAGNOSIS — O24.410 DIET CONTROLLED GESTATIONAL DIABETES MELLITUS (GDM) IN SECOND TRIMESTER: ICD-10-CM

## 2023-10-03 DIAGNOSIS — R03.0 ELEVATED BLOOD PRESSURE READING IN OFFICE WITHOUT DIAGNOSIS OF HYPERTENSION: ICD-10-CM

## 2023-10-03 DIAGNOSIS — O99.212 OBESITY AFFECTING PREGNANCY IN SECOND TRIMESTER, UNSPECIFIED OBESITY TYPE: ICD-10-CM

## 2023-10-03 DIAGNOSIS — O28.1 ABNORMAL BIOCHEMICAL FINDING ON ANTENATAL SCREENING OF MOTHER: ICD-10-CM

## 2023-10-03 PROCEDURE — 76815 OB US LIMITED FETUS(S): CPT | Performed by: OBSTETRICS & GYNECOLOGY

## 2023-10-03 RX ORDER — INSULIN DETEMIR 100 [IU]/ML
10 INJECTION, SOLUTION SUBCUTANEOUS NIGHTLY
Qty: 5 EACH | Refills: 11 | Status: SHIPPED | OUTPATIENT
Start: 2023-10-03

## 2023-10-06 ENCOUNTER — HOSPITAL ENCOUNTER (OUTPATIENT)
Dept: ENDOCRINOLOGY | Facility: HOSPITAL | Age: 28
Discharge: HOME | End: 2023-10-06
Attending: ADVANCED PRACTICE MIDWIFE
Payer: MEDICAID

## 2023-10-06 DIAGNOSIS — O24.419 GESTATIONAL DIABETES MELLITUS (GDM), ANTEPARTUM, GESTATIONAL DIABETES METHOD OF CONTROL UNSPECIFIED: Primary | ICD-10-CM

## 2023-10-06 NOTE — PROGRESS NOTES
Gómez Connolly  : 1995 was seen for GDM  Individual Follow-Up Counseling    Date: 10/6/2023  Referring Provider: Benigno WAGNER  Start time: 10:15 am  End time: 10:45 am    Assessment: LMP 2023 (Exact Date)   Weight: Wt Readings from Last 6 Encounters:  10/03/23 : 236 lb  23 : 236 lb 9.6 oz  23 : 238 lb 8 oz  23 : 238 lb  23 : 234 lb  22 : 203 lb      Blood Glucose: FB-125( 0 of 13 readings in target) (13 readings). PP: B: (9 of 12 readings in target) ; L:  ( 10 of 12 readings in target); D: ( 11 of 12 readings in target.)   Persistent fasting hyperglycemia patterns identified. Per patient, on 10/3/23 she began administering 10 units of Levemir  daily at bedtime. Gestational Diabetes goals assessed by patient: 4 - frequently, continue with current goals/plan    Diet:   No food log available for review. The following is based on the patient's verbal report. Following meal plan: Yes/No: Yes  Skips: HS Snack  Meals are Balanced. Carb Intake is inadequate. Some times under the recommended amount. Patient reports that on some days her meals are focused on protein and vegetables. Protein Intake is adequate. Food Selections are healthy. Exercise:     walks for 20-30 minutes 3 times a week. Education:     GDM Overview:  Reviewed target blood glucose values for GDM. Discussed benefits/risks to mother/baby management options to improve/maintain glucose control. Healthy Eating:  Reviewed/Reinforced:  Nutrition concepts for pregnancy/healthy eating and effect of food on blood glucose. Meal planning process and benefits of pre-planning meals/snacks. Appropriate timing of meals/snacks. Carb counting. Additional concepts: Increasing fiber, Controlling sodium, and Reducing fat. Follow the gestational nutrition guide. Being Active:  Reviewed benefits of effects of activity on BG values.   Reviewed types of activity, duration, precautions. Monitoring:  Instructed to report readings to MD as directed. Call MD: if FBG is > 95 twice in 1 week. If 2 hr PP is > 120 twice in 1 week at any one meal.  Call Diabetic Educator is ketones are consistently elevated. Taking Medication:  Reviewed appropriate timing (if on insulin) of meds. Reviewed probability of needing medication adjustments throughout pregnancy. Reducing Risk:  Discussed management of (hyperglycemia, hypoglycemia) and when to call provider. Recommendations:      1. Follow recommended meal plan. 2. Test blood glucose and ketones as directed. 3. Bring glucose log to each MD visit. 4. Start/continue activity if no restrictions. Patient verbalized understanding and has no further questions at this time.     Brunetta Meigs, RN

## 2023-10-12 ENCOUNTER — TELEPHONE (OUTPATIENT)
Dept: PERINATAL CARE | Facility: HOSPITAL | Age: 28
End: 2023-10-12

## 2023-10-12 NOTE — TELEPHONE ENCOUNTER
18w0d  Received BS log for date range 10/4-10/8     Low  High Out of Range   Fasting Blood Sugar 105 125 5/5   Post Breakfast 97 126 2/4   Post Lunch 91 122 1/4   Post Dinner 94 128 1/4     Levemir 10 units qHS

## 2023-10-18 ENCOUNTER — TELEPHONE (OUTPATIENT)
Dept: PERINATAL CARE | Facility: HOSPITAL | Age: 28
End: 2023-10-18

## 2023-10-19 ENCOUNTER — TELEPHONE (OUTPATIENT)
Dept: PERINATAL CARE | Facility: HOSPITAL | Age: 28
End: 2023-10-19

## 2023-10-19 NOTE — TELEPHONE ENCOUNTER
Increase Levemir to the following:    Levemir 8 units subcutaneous every morning  Levemir 28 units subcutaneous every night with your bedtime snack    Magdalena Villasenor MD

## 2023-10-19 NOTE — TELEPHONE ENCOUNTER
Pt  19 0/7 weeks  Blood glucose log from  10/9-10/18     Low  High Out of Range   Fasting Blood Sugar 92 125 8/10   Post Breakfast 93 120 1/9   Post Lunch 93 124 2/9   Post Dinner 95 130 3/9       Taking     Levemir 4 units qAM    Levemir 18 units qHS

## 2023-10-23 ENCOUNTER — PATIENT MESSAGE (OUTPATIENT)
Dept: OBGYN CLINIC | Facility: CLINIC | Age: 28
End: 2023-10-23

## 2023-10-23 DIAGNOSIS — O24.419 GDM, CLASS A2: Primary | ICD-10-CM

## 2023-10-23 DIAGNOSIS — O24.419 GDM, CLASS A2: ICD-10-CM

## 2023-10-23 RX ORDER — LANCETS 30 GAUGE
EACH MISCELLANEOUS
Qty: 200 EACH | Refills: 5 | OUTPATIENT
Start: 2023-10-23

## 2023-10-23 RX ORDER — LANCETS 30 GAUGE
EACH MISCELLANEOUS
Qty: 200 EACH | Refills: 5 | Status: SHIPPED | OUTPATIENT
Start: 2023-10-23

## 2023-10-23 RX ORDER — BLOOD-GLUCOSE METER
EACH MISCELLANEOUS
Qty: 300 STRIP | Refills: 3 | Status: SHIPPED | OUTPATIENT
Start: 2023-10-23

## 2023-10-26 ENCOUNTER — TELEPHONE (OUTPATIENT)
Dept: PERINATAL CARE | Facility: HOSPITAL | Age: 28
End: 2023-10-26

## 2023-10-28 ENCOUNTER — ROUTINE PRENATAL (OUTPATIENT)
Dept: OBGYN CLINIC | Facility: CLINIC | Age: 28
End: 2023-10-28

## 2023-10-28 VITALS
WEIGHT: 236.13 LBS | DIASTOLIC BLOOD PRESSURE: 70 MMHG | HEIGHT: 67 IN | SYSTOLIC BLOOD PRESSURE: 128 MMHG | BODY MASS INDEX: 37.06 KG/M2

## 2023-10-28 DIAGNOSIS — Z91.89 AT RISK FOR ANEUPLOIDY: Primary | ICD-10-CM

## 2023-10-28 PROCEDURE — 3008F BODY MASS INDEX DOCD: CPT | Performed by: OBSTETRICS & GYNECOLOGY

## 2023-10-28 PROCEDURE — 99213 OFFICE O/P EST LOW 20 MIN: CPT | Performed by: OBSTETRICS & GYNECOLOGY

## 2023-10-28 PROCEDURE — 90686 IIV4 VACC NO PRSV 0.5 ML IM: CPT | Performed by: OBSTETRICS & GYNECOLOGY

## 2023-10-28 PROCEDURE — 3074F SYST BP LT 130 MM HG: CPT | Performed by: OBSTETRICS & GYNECOLOGY

## 2023-10-28 PROCEDURE — 3078F DIAST BP <80 MM HG: CPT | Performed by: OBSTETRICS & GYNECOLOGY

## 2023-10-28 PROCEDURE — 90471 IMMUNIZATION ADMIN: CPT | Performed by: OBSTETRICS & GYNECOLOGY

## 2023-10-28 NOTE — PROGRESS NOTES
Denies pain, bleeding, LOF. Positive FM    Placed on insulin bid per MFM       33year old  at 20w2d by LMP     Return OB  Pre- Care: UTD. Anatomy US scheduled 10/30  Flu vax today  Patient Active Problem List:     Elevated blood pressure reading in office without diagnosis of hypertension     GDM, class A2     Obesity affecting pregnancy in second trimester     At risk for aneuploidy - high risk ffDNA    MFM RECOMMENDATIONS:  Level II ultrasound at 20 weeks  Weekly Maternal-Fetal Medicine review of capillary blood glucose values  Follow-up growth ultrasound every 4 weeks in the third trimester  Weekly NSTs at 32 weeks; twice weekly NST's at 34 weeks  Delivery at 38-39  weeks advised for medication controlled diabetes  Initiate insulin  Low-dose  mg (1.5 tabs) daily  Continue home BP monitoringPatient counseled regarding abnormal ffDNA per MFM - planning level  US; declines amniocentesis    Last HGBA1C 5.7,.  repeat 2024    - Return to clinic in: 4 wks  Jr Joseph MD

## 2023-10-30 ENCOUNTER — HOSPITAL ENCOUNTER (OUTPATIENT)
Dept: PERINATAL CARE | Facility: HOSPITAL | Age: 28
Discharge: HOME | End: 2023-10-30
Attending: OBSTETRICS & GYNECOLOGY
Payer: MEDICAID

## 2023-10-30 ENCOUNTER — HOSPITAL ENCOUNTER (OUTPATIENT)
Dept: PERINATAL CARE | Facility: HOSPITAL | Age: 28
End: 2023-10-30
Attending: ADVANCED PRACTICE MIDWIFE
Payer: MEDICAID

## 2023-10-30 VITALS
DIASTOLIC BLOOD PRESSURE: 84 MMHG | HEART RATE: 100 BPM | SYSTOLIC BLOOD PRESSURE: 137 MMHG | BODY MASS INDEX: 37 KG/M2 | WEIGHT: 235 LBS

## 2023-10-30 DIAGNOSIS — E66.8 OTHER OBESITY AFFECTING PREGNANCY IN THIRD TRIMESTER: ICD-10-CM

## 2023-10-30 DIAGNOSIS — O24.419 GDM, CLASS A2: ICD-10-CM

## 2023-10-30 DIAGNOSIS — O99.213 OTHER OBESITY AFFECTING PREGNANCY IN THIRD TRIMESTER: ICD-10-CM

## 2023-10-30 DIAGNOSIS — O24.414 INSULIN CONTROLLED GESTATIONAL DIABETES MELLITUS (GDM) IN THIRD TRIMESTER: ICD-10-CM

## 2023-10-30 DIAGNOSIS — O99.212 OBESITY AFFECTING PREGNANCY IN SECOND TRIMESTER: ICD-10-CM

## 2023-10-30 DIAGNOSIS — Z91.89 AT RISK FOR ANEUPLOIDY: Primary | ICD-10-CM

## 2023-10-30 PROCEDURE — 76811 OB US DETAILED SNGL FETUS: CPT | Performed by: OBSTETRICS & GYNECOLOGY

## 2023-10-30 RX ORDER — INSULIN DETEMIR 100 [IU]/ML
INJECTION, SOLUTION SUBCUTANEOUS
COMMUNITY
Start: 2023-10-30

## 2023-11-02 ENCOUNTER — TELEPHONE (OUTPATIENT)
Dept: PERINATAL CARE | Facility: HOSPITAL | Age: 28
End: 2023-11-02

## 2023-11-03 ENCOUNTER — TELEPHONE (OUTPATIENT)
Dept: PERINATAL CARE | Facility: HOSPITAL | Age: 28
End: 2023-11-03

## 2023-11-03 NOTE — TELEPHONE ENCOUNTER
Blood Glucose flow sheet  reviewed  through  11/1        Recommendations    Continue  Levemir 8 units subcutaneous every morning  Levemir 28 units subcutaneous every night with your bedtime snack    Blood glucose monitoring   With weekly review by MFM    My chart message sent with MD recommendations

## 2023-11-09 ENCOUNTER — PATIENT MESSAGE (OUTPATIENT)
Dept: OBGYN CLINIC | Facility: CLINIC | Age: 28
End: 2023-11-09

## 2023-11-09 DIAGNOSIS — O24.419 GDM, CLASS A2: ICD-10-CM

## 2023-11-10 ENCOUNTER — TELEPHONE (OUTPATIENT)
Dept: PERINATAL CARE | Facility: HOSPITAL | Age: 28
End: 2023-11-10

## 2023-11-10 ENCOUNTER — APPOINTMENT (OUTPATIENT)
Dept: ENDOCRINOLOGY | Facility: HOSPITAL | Age: 28
End: 2023-11-10
Attending: ADVANCED PRACTICE MIDWIFE
Payer: MEDICAID

## 2023-11-10 DIAGNOSIS — O24.419 GDM, CLASS A2: ICD-10-CM

## 2023-11-10 RX ORDER — INSULIN DETEMIR 100 [IU]/ML
10 INJECTION, SOLUTION SUBCUTANEOUS EVERY MORNING
Qty: 15 ML | Refills: 1 | Status: SHIPPED | OUTPATIENT
Start: 2023-11-10

## 2023-11-10 RX ORDER — INSULIN DETEMIR 100 [IU]/ML
8 INJECTION, SOLUTION SUBCUTANEOUS EVERY MORNING
Qty: 15 ML | Refills: 1 | Status: SHIPPED | OUTPATIENT
Start: 2023-11-10 | End: 2023-11-10

## 2023-11-10 NOTE — TELEPHONE ENCOUNTER
22w1d  Received BS log for date range 11/4-11/8-     Low  High Out of Range   Fasting Blood Sugar 103 110 5/5   Post Breakfast 91 97 0/5   Post Lunch 110 119 0/5   Post Dinner 119 125 2/5     .    Levemir 8 units subcutaneous every morning  Levemir 28 units subcutaneous every night with your bedtime snack

## 2023-11-10 NOTE — TELEPHONE ENCOUNTER
Levemir 10 units subcutaneous every morning  Levemir 34 units subcutaneous every night with your bedtime snack

## 2023-11-17 ENCOUNTER — TELEPHONE (OUTPATIENT)
Dept: PERINATAL CARE | Facility: HOSPITAL | Age: 28
End: 2023-11-17

## 2023-11-20 ENCOUNTER — TELEPHONE (OUTPATIENT)
Dept: PERINATAL CARE | Facility: HOSPITAL | Age: 28
End: 2023-11-20

## 2023-11-20 NOTE — TELEPHONE ENCOUNTER
Levemir 10 units subcutaneous every morning  Levemir 42 units subcutaneous every night with your bedtime snack

## 2023-11-20 NOTE — TELEPHONE ENCOUNTER
23w4d  Received BS log for date range 1/11-11/17-     Low  High Out of Range   Fasting Blood Sugar 96 100 7/7   Post Breakfast 95 105 0/7   Post Lunch 99 117 0/7   Post Dinner 118 121 2/7     Levemir 10 units subcutaneous every morning  Levemir 34 units subcutaneous every night with your bedtime snack

## 2023-11-27 ENCOUNTER — TELEPHONE (OUTPATIENT)
Dept: PERINATAL CARE | Facility: HOSPITAL | Age: 28
End: 2023-11-27

## 2023-11-27 ENCOUNTER — ROUTINE PRENATAL (OUTPATIENT)
Dept: OBGYN CLINIC | Facility: CLINIC | Age: 28
End: 2023-11-27
Payer: MEDICAID

## 2023-11-27 VITALS
SYSTOLIC BLOOD PRESSURE: 126 MMHG | DIASTOLIC BLOOD PRESSURE: 74 MMHG | HEIGHT: 67 IN | BODY MASS INDEX: 37.83 KG/M2 | WEIGHT: 241 LBS

## 2023-11-27 DIAGNOSIS — O24.419 GDM, CLASS A2: ICD-10-CM

## 2023-11-27 DIAGNOSIS — O09.899 SUPERVISION OF OTHER HIGH RISK PREGNANCY, ANTEPARTUM: Primary | ICD-10-CM

## 2023-11-27 RX ORDER — INSULIN DETEMIR 100 [IU]/ML
12 INJECTION, SOLUTION SUBCUTANEOUS EVERY MORNING
Qty: 15 ML | Refills: 1 | Status: SHIPPED | OUTPATIENT
Start: 2023-11-27

## 2023-11-27 NOTE — PROGRESS NOTES
Denies pain, bleeding, LOF. Positive FM    Placed on insulin bid per MFM       33year old  at 18w2d by LMP     Return OB  Pre- Care: UTD. Anatomy US normal 10/30  CBC and hemoglobin A1C next visit. Tdap next visit. Patient Active Problem List   Diagnosis    Elevated blood pressure reading in office without diagnosis of hypertension    GDM, class A2    Obesity affecting pregnancy in second trimester    At risk for aneuploidy - high risk ffDNA     MFM RECOMMENDATIONS:  Weekly Maternal-Fetal Medicine review of capillary blood glucose values  Follow-up growth ultrasound every 4 weeks in the third trimester  Weekly NSTs at 32 weeks; twice weekly NST's at 34 weeks  Delivery at 38-39  weeks advised for medication controlled diabetes  Continue insulin  Low-dose  mg (1.5 tabs) daily  Continue home BP monitoring  Patient counseled regarding abnormal ffDNA per MFM - planning level  US; declines amniocentesis    Last HGBA1C 5.7,.  repeat 2024    - Return to clinic in: 4 wks  Elvis Sierra MD

## 2023-11-27 NOTE — TELEPHONE ENCOUNTER
Levemir 12 units subcutaneous every morning  Levemir 50 units subcutaneous every night with your bedtime snack    Start novolog 14 units with dinner.

## 2023-11-27 NOTE — TELEPHONE ENCOUNTER
Pt  24 4/7 weeks  Blood glucose log from  11/20-11/26      Low  High Out of Range   Fasting Blood Sugar 107 123 7/7   Post Breakfast 95 112 0/6   Post Lunch 96 116 0/7   Post Dinner 121 140 7/7       Taking         Levemir 10 units subcutaneous every morning  Levemir 42 units subcutaneous every night with your bedtime snack

## 2023-12-04 ENCOUNTER — TELEPHONE (OUTPATIENT)
Dept: PERINATAL CARE | Facility: HOSPITAL | Age: 28
End: 2023-12-04

## 2023-12-04 DIAGNOSIS — O24.419 GDM, CLASS A2: ICD-10-CM

## 2023-12-04 RX ORDER — INSULIN DETEMIR 100 [IU]/ML
14 INJECTION, SOLUTION SUBCUTANEOUS EVERY MORNING
Qty: 15 ML | Refills: 1 | Status: SHIPPED | OUTPATIENT
Start: 2023-12-04

## 2023-12-04 NOTE — TELEPHONE ENCOUNTER
Pt  25 4/7 weeks  Blood glucose log from  11/27-12/3      Low  High Out of Range   Fasting Blood Sugar 94 120 6/7   Post Breakfast 94 110 0/6   Post Lunch 103 115 0/7   Post Dinner 118 134 4/7       Taking         Levemir 12 units subcutaneous every morning  Levemir 50 units subcutaneous every night with your bedtime snack     Start novolog 14 units with dinner.

## 2023-12-04 NOTE — TELEPHONE ENCOUNTER
Levemir 14 units subcutaneous every morning  Levemir 60 units (divided into 2 injections of 30 units each placed at least 2 inches apart) subcutaneous every night with your bedtime snack     novolog 20 units with dinner.

## 2023-12-11 ENCOUNTER — TELEPHONE (OUTPATIENT)
Dept: PERINATAL CARE | Facility: HOSPITAL | Age: 28
End: 2023-12-11

## 2023-12-11 DIAGNOSIS — O24.419 GDM, CLASS A2: ICD-10-CM

## 2023-12-11 RX ORDER — INSULIN DETEMIR 100 [IU]/ML
14 INJECTION, SOLUTION SUBCUTANEOUS EVERY MORNING
Qty: 15 ML | Refills: 1 | Status: SHIPPED | OUTPATIENT
Start: 2023-12-11

## 2023-12-11 NOTE — TELEPHONE ENCOUNTER
Levemir 14 units subcutaneous every morning  Levemir 72 units (divided into 2 injections of 36 units each placed at least 2 inches apart) subcutaneous every night with your bedtime snack      novolog 24 units with dinner.

## 2023-12-11 NOTE — TELEPHONE ENCOUNTER
Pt  26 4/7 weeks  Blood glucose log from   12/4-12/10     Low  High Out of Range   Fasting Blood Sugar 95 99 7/7   Post Breakfast 96 110 0/5   Post Lunch 99 117 0/7   Post Dinner 118 123 4/7       Taking         Levemir 14 units subcutaneous every morning  Levemir 60 units (divided into 2 injections of 30 units each placed at least 2 inches apart) subcutaneous every night with your bedtime snack      novolog 20 units with dinner.

## 2023-12-18 ENCOUNTER — TELEPHONE (OUTPATIENT)
Dept: PERINATAL CARE | Facility: HOSPITAL | Age: 28
End: 2023-12-18

## 2023-12-19 ENCOUNTER — TELEPHONE (OUTPATIENT)
Dept: PERINATAL CARE | Facility: HOSPITAL | Age: 28
End: 2023-12-19

## 2023-12-20 ENCOUNTER — TELEPHONE (OUTPATIENT)
Dept: PERINATAL CARE | Facility: HOSPITAL | Age: 28
End: 2023-12-20

## 2023-12-20 NOTE — TELEPHONE ENCOUNTER
Blood Glucose flow sheet  reviewed  through 12/19        Recommendations    Levemir 14 units subcutaneous every morning  Levemir 72 units (divided into 2 injections of 36 units each placed at least 2 inches apart) subcutaneous every night with bedtime snack      novolog 24 units with dinner.     Blood glucose monitoring   With weekly review by DOMINIQUEM

## 2023-12-26 ENCOUNTER — PATIENT MESSAGE (OUTPATIENT)
Dept: PERINATAL CARE | Facility: HOSPITAL | Age: 28
End: 2023-12-26

## 2023-12-27 ENCOUNTER — LAB ENCOUNTER (OUTPATIENT)
Dept: LAB | Facility: HOSPITAL | Age: 28
End: 2023-12-27
Attending: OBSTETRICS & GYNECOLOGY
Payer: MEDICAID

## 2023-12-27 ENCOUNTER — HOSPITAL ENCOUNTER (OUTPATIENT)
Dept: PERINATAL CARE | Facility: HOSPITAL | Age: 28
Discharge: HOME | End: 2023-12-27
Attending: OBSTETRICS & GYNECOLOGY
Payer: MEDICAID

## 2023-12-27 ENCOUNTER — HOSPITAL ENCOUNTER (OUTPATIENT)
Dept: PERINATAL CARE | Facility: HOSPITAL | Age: 28
Discharge: HOME | End: 2023-12-27
Attending: FAMILY MEDICINE
Payer: MEDICAID

## 2023-12-27 ENCOUNTER — TELEPHONE (OUTPATIENT)
Dept: OBGYN CLINIC | Facility: CLINIC | Age: 28
End: 2023-12-27

## 2023-12-27 ENCOUNTER — ROUTINE PRENATAL (OUTPATIENT)
Dept: OBGYN CLINIC | Facility: CLINIC | Age: 28
End: 2023-12-27
Payer: MEDICAID

## 2023-12-27 VITALS
WEIGHT: 230 LBS | HEART RATE: 103 BPM | DIASTOLIC BLOOD PRESSURE: 75 MMHG | BODY MASS INDEX: 36 KG/M2 | SYSTOLIC BLOOD PRESSURE: 121 MMHG

## 2023-12-27 VITALS
BODY MASS INDEX: 36.41 KG/M2 | HEIGHT: 67 IN | WEIGHT: 232 LBS | DIASTOLIC BLOOD PRESSURE: 72 MMHG | SYSTOLIC BLOOD PRESSURE: 122 MMHG

## 2023-12-27 DIAGNOSIS — O99.213 OBESITY AFFECTING PREGNANCY IN THIRD TRIMESTER, UNSPECIFIED OBESITY TYPE: Primary | ICD-10-CM

## 2023-12-27 DIAGNOSIS — O24.419 GDM, CLASS A2: ICD-10-CM

## 2023-12-27 DIAGNOSIS — O24.419 GDM, CLASS A2: Primary | ICD-10-CM

## 2023-12-27 DIAGNOSIS — O09.899 SUPERVISION OF OTHER HIGH RISK PREGNANCY, ANTEPARTUM: ICD-10-CM

## 2023-12-27 DIAGNOSIS — O99.213 OBESITY AFFECTING PREGNANCY IN THIRD TRIMESTER, UNSPECIFIED OBESITY TYPE: ICD-10-CM

## 2023-12-27 PROBLEM — R03.0 ELEVATED BLOOD PRESSURE READING IN OFFICE WITHOUT DIAGNOSIS OF HYPERTENSION: Chronic | Status: ACTIVE | Noted: 2023-09-12

## 2023-12-27 LAB
BASOPHILS # BLD AUTO: 0.03 X10(3) UL (ref 0–0.2)
BASOPHILS NFR BLD AUTO: 0.2 %
DEPRECATED RDW RBC AUTO: 41.4 FL (ref 35.1–46.3)
EOSINOPHIL # BLD AUTO: 0.06 X10(3) UL (ref 0–0.7)
EOSINOPHIL NFR BLD AUTO: 0.5 %
ERYTHROCYTE [DISTWIDTH] IN BLOOD BY AUTOMATED COUNT: 14 % (ref 11–15)
EST. AVERAGE GLUCOSE BLD GHB EST-MCNC: 117 MG/DL (ref 68–126)
HBA1C MFR BLD: 5.7 % (ref ?–5.7)
HCT VFR BLD AUTO: 36.2 %
HGB BLD-MCNC: 11.7 G/DL
IMM GRANULOCYTES # BLD AUTO: 0.06 X10(3) UL (ref 0–1)
IMM GRANULOCYTES NFR BLD: 0.5 %
LYMPHOCYTES # BLD AUTO: 2.63 X10(3) UL (ref 1–4)
LYMPHOCYTES NFR BLD AUTO: 19.9 %
MCH RBC QN AUTO: 26.4 PG (ref 26–34)
MCHC RBC AUTO-ENTMCNC: 32.3 G/DL (ref 31–37)
MCV RBC AUTO: 81.5 FL
MONOCYTES # BLD AUTO: 0.96 X10(3) UL (ref 0.1–1)
MONOCYTES NFR BLD AUTO: 7.3 %
NEUTROPHILS # BLD AUTO: 9.46 X10 (3) UL (ref 1.5–7.7)
NEUTROPHILS # BLD AUTO: 9.46 X10(3) UL (ref 1.5–7.7)
NEUTROPHILS NFR BLD AUTO: 71.6 %
PLATELET # BLD AUTO: 278 10(3)UL (ref 150–450)
RBC # BLD AUTO: 4.44 X10(6)UL
WBC # BLD AUTO: 13.2 X10(3) UL (ref 4–11)

## 2023-12-27 PROCEDURE — 3078F DIAST BP <80 MM HG: CPT | Performed by: OBSTETRICS & GYNECOLOGY

## 2023-12-27 PROCEDURE — 83036 HEMOGLOBIN GLYCOSYLATED A1C: CPT

## 2023-12-27 PROCEDURE — 3008F BODY MASS INDEX DOCD: CPT | Performed by: OBSTETRICS & GYNECOLOGY

## 2023-12-27 PROCEDURE — 76816 OB US FOLLOW-UP PER FETUS: CPT | Performed by: OBSTETRICS & GYNECOLOGY

## 2023-12-27 PROCEDURE — 90471 IMMUNIZATION ADMIN: CPT | Performed by: OBSTETRICS & GYNECOLOGY

## 2023-12-27 PROCEDURE — 85025 COMPLETE CBC W/AUTO DIFF WBC: CPT

## 2023-12-27 PROCEDURE — 99213 OFFICE O/P EST LOW 20 MIN: CPT | Performed by: OBSTETRICS & GYNECOLOGY

## 2023-12-27 PROCEDURE — 3074F SYST BP LT 130 MM HG: CPT | Performed by: OBSTETRICS & GYNECOLOGY

## 2023-12-27 PROCEDURE — 36415 COLL VENOUS BLD VENIPUNCTURE: CPT

## 2023-12-27 PROCEDURE — 90715 TDAP VACCINE 7 YRS/> IM: CPT | Performed by: OBSTETRICS & GYNECOLOGY

## 2023-12-27 NOTE — PROGRESS NOTES
32 y/o  at 34 W needs TDAP today, hgba1c and CBC sent today. GDMA2 - managed by [facility]  Obesity in pregnancy  High risk cell free DNA - declined repeat testing. [facility] recommendations:  Continue care with Dr. Franca Ruiz  Level II ultrasound at 20 weeks  Limit weight gain to 11-20 pounds.   Continue four times daily capillary blood glucose assessments (fasting and 2 hour postprandial)  Weekly Maternal-Fetal Medicine review of capillary blood glucose values  Follow-up growth ultrasound every 4 weeks in the third trimester  Weekly NSTs at 32 weeks; twice weekly NST's at 34 weeks  Delivery at 38-39  weeks advised for medication controlled diabetes  Initiate insulin  Low-dose  mg (1.5 tabs) daily  Continue home BP monitoring

## 2024-01-03 ENCOUNTER — TELEPHONE (OUTPATIENT)
Dept: PERINATAL CARE | Facility: HOSPITAL | Age: 29
End: 2024-01-03

## 2024-01-05 ENCOUNTER — TELEPHONE (OUTPATIENT)
Dept: PERINATAL CARE | Facility: HOSPITAL | Age: 29
End: 2024-01-05

## 2024-01-09 ENCOUNTER — TELEPHONE (OUTPATIENT)
Dept: PERINATAL CARE | Facility: HOSPITAL | Age: 29
End: 2024-01-09

## 2024-01-09 NOTE — TELEPHONE ENCOUNTER
30w5d  Received BS log for date range 1/1-1/7     Low  High Out of Range   Fasting Blood Sugar 95 96 3/7   Post Breakfast 97 100 0/6   Post Lunch 99 110 0/7   Post Dinner 116 120 0/7         Levemir 14 units subcutaneous every morning  Levemir 72 units (divided into 2 injections of 36 units each placed at least 2 inches apart) subcutaneous every night with bedtime snack      novolog 24 units with dinner.

## 2024-01-09 NOTE — TELEPHONE ENCOUNTER
2/7 dinners elevated.    Levemir 14 units subcutaneous every morning  Levemir 80 units (divided into 2 injections of 40 units each placed at least 2 inches apart) subcutaneous every night with bedtime snack      novolog 28 units with dinner.

## 2024-01-15 ENCOUNTER — TELEPHONE (OUTPATIENT)
Dept: PERINATAL CARE | Facility: HOSPITAL | Age: 29
End: 2024-01-15

## 2024-01-23 ENCOUNTER — ROUTINE PRENATAL (OUTPATIENT)
Dept: OBGYN CLINIC | Facility: CLINIC | Age: 29
End: 2024-01-23
Payer: MEDICAID

## 2024-01-23 VITALS
DIASTOLIC BLOOD PRESSURE: 52 MMHG | SYSTOLIC BLOOD PRESSURE: 140 MMHG | HEIGHT: 67 IN | WEIGHT: 235 LBS | BODY MASS INDEX: 36.88 KG/M2

## 2024-01-23 DIAGNOSIS — O10.019 BENIGN ESSENTIAL HYPERTENSION, ANTEPARTUM: ICD-10-CM

## 2024-01-23 DIAGNOSIS — O09.899 SUPERVISION OF OTHER HIGH RISK PREGNANCY, ANTEPARTUM: Primary | ICD-10-CM

## 2024-01-23 DIAGNOSIS — O24.319 PREGESTATIONAL DIABETES MELLITUS, MODIFIED WHITE CLASS B: ICD-10-CM

## 2024-01-23 PROBLEM — O10.919 CHRONIC HYPERTENSION AFFECTING PREGNANCY: Status: ACTIVE | Noted: 2023-09-12

## 2024-01-23 PROBLEM — O10.919 CHRONIC HYPERTENSION AFFECTING PREGNANCY (HCC): Status: ACTIVE | Noted: 2023-09-12

## 2024-01-23 PROCEDURE — 3008F BODY MASS INDEX DOCD: CPT | Performed by: OBSTETRICS & GYNECOLOGY

## 2024-01-23 PROCEDURE — 3078F DIAST BP <80 MM HG: CPT | Performed by: OBSTETRICS & GYNECOLOGY

## 2024-01-23 PROCEDURE — 99213 OFFICE O/P EST LOW 20 MIN: CPT | Performed by: OBSTETRICS & GYNECOLOGY

## 2024-01-23 PROCEDURE — 59025 FETAL NON-STRESS TEST: CPT | Performed by: OBSTETRICS & GYNECOLOGY

## 2024-01-23 PROCEDURE — 3077F SYST BP >= 140 MM HG: CPT | Performed by: OBSTETRICS & GYNECOLOGY

## 2024-01-23 NOTE — PROGRESS NOTES
28 year old  at 32w5d     Contractions: No  VB: No  LOF: No  Fetal movement: Yes    Return OB  Pre-Barbara Care: UTD.     Breech  - reviewed exercises to encourage vertex presentation  - cont to monitor, consider ECV if still breech 36-37 wks    GDM - likely pregestational  - Following with MFM  - weekly NST - reactive today, start twice weekly 34 wks  MFM RECOMMENDATIONS:  Weekly Maternal-Fetal Medicine review of capillary blood glucose values  Follow-up growth ultrasound every 4 weeks in the third trimester  Weekly NSTs at 32 weeks; twice weekly NST's at 34 weeks  Delivery at 38-39  weeks advised for medication controlled diabetes  Continue insulin  Low-dose  mg (1.5 tabs) daily  Continue home BP monitoring  Patient counseled regarding abnormal ffDNA per MFM - planning level  US; declines amniocentesis    Chronic HTN  - no medications  - cont baby ASA    Patient Active Problem List   Diagnosis    Chronic hypertension affecting pregnancy    GDM, likely pregestational    Obesity affecting pregnancy in third trimester    At risk for aneuploidy - high risk ffDNA    Breech presentation       - Return to clinic in: 1 week    Eloina Hussein DO

## 2024-01-24 ENCOUNTER — HOSPITAL ENCOUNTER (OUTPATIENT)
Dept: PERINATAL CARE | Facility: HOSPITAL | Age: 29
Discharge: HOME | End: 2024-01-24
Attending: FAMILY MEDICINE
Payer: MEDICAID

## 2024-01-24 ENCOUNTER — HOSPITAL ENCOUNTER (OUTPATIENT)
Dept: PERINATAL CARE | Facility: HOSPITAL | Age: 29
Discharge: HOME | End: 2024-01-24
Attending: OBSTETRICS & GYNECOLOGY
Payer: MEDICAID

## 2024-01-24 VITALS
BODY MASS INDEX: 37 KG/M2 | DIASTOLIC BLOOD PRESSURE: 76 MMHG | WEIGHT: 235 LBS | HEART RATE: 92 BPM | SYSTOLIC BLOOD PRESSURE: 129 MMHG

## 2024-01-24 DIAGNOSIS — O99.213 OBESITY AFFECTING PREGNANCY IN THIRD TRIMESTER, UNSPECIFIED OBESITY TYPE: Primary | Chronic | ICD-10-CM

## 2024-01-24 DIAGNOSIS — O24.419 GDM, CLASS A2: Chronic | ICD-10-CM

## 2024-01-24 DIAGNOSIS — O10.919 CHRONIC HYPERTENSION AFFECTING PREGNANCY: ICD-10-CM

## 2024-01-24 DIAGNOSIS — O99.213 OBESITY AFFECTING PREGNANCY IN THIRD TRIMESTER, UNSPECIFIED OBESITY TYPE: Chronic | ICD-10-CM

## 2024-01-24 DIAGNOSIS — O24.419 GDM, CLASS A2: ICD-10-CM

## 2024-01-24 PROCEDURE — 76819 FETAL BIOPHYS PROFIL W/O NST: CPT

## 2024-01-24 PROCEDURE — 76816 OB US FOLLOW-UP PER FETUS: CPT | Performed by: OBSTETRICS & GYNECOLOGY

## 2024-01-24 RX ORDER — INSULIN DETEMIR 100 [IU]/ML
14 INJECTION, SOLUTION SUBCUTANEOUS EVERY MORNING
Qty: 15 ML | Refills: 1 | Status: SHIPPED | OUTPATIENT
Start: 2024-01-24

## 2024-01-24 RX ORDER — ASPIRIN 81 MG/1
120 TABLET, CHEWABLE ORAL DAILY
Qty: 60 TABLET | Refills: 2 | Status: SHIPPED | OUTPATIENT
Start: 2024-01-24

## 2024-01-24 NOTE — ADDENDUM NOTE
Encounter addended by: Lewis Gonzalez DO on: 1/24/2024 11:38 AM   Actions taken: Clinical Note Signed

## 2024-01-25 RX ORDER — INSULIN LISPRO 100 [IU]/ML
24 INJECTION, SOLUTION INTRAVENOUS; SUBCUTANEOUS
Qty: 3 ML | Refills: 5 | Status: SHIPPED | OUTPATIENT
Start: 2024-01-25

## 2024-01-29 ENCOUNTER — ROUTINE PRENATAL (OUTPATIENT)
Dept: OBGYN CLINIC | Facility: CLINIC | Age: 29
End: 2024-01-29
Payer: MEDICAID

## 2024-01-29 ENCOUNTER — NURSE ONLY (OUTPATIENT)
Dept: OBGYN CLINIC | Facility: CLINIC | Age: 29
End: 2024-01-29
Payer: MEDICAID

## 2024-01-29 VITALS
BODY MASS INDEX: 36.57 KG/M2 | DIASTOLIC BLOOD PRESSURE: 50 MMHG | SYSTOLIC BLOOD PRESSURE: 120 MMHG | HEIGHT: 67 IN | WEIGHT: 233 LBS

## 2024-01-29 DIAGNOSIS — O24.419 GDM, CLASS A2: Chronic | ICD-10-CM

## 2024-01-29 DIAGNOSIS — E66.01 SEVERE OBESITY DUE TO EXCESS CALORIES AFFECTING PREGNANCY IN THIRD TRIMESTER (HCC): Chronic | ICD-10-CM

## 2024-01-29 DIAGNOSIS — O10.919 CHRONIC HYPERTENSION AFFECTING PREGNANCY: Primary | ICD-10-CM

## 2024-01-29 DIAGNOSIS — O99.213 SEVERE OBESITY DUE TO EXCESS CALORIES AFFECTING PREGNANCY IN THIRD TRIMESTER (HCC): Chronic | ICD-10-CM

## 2024-01-29 PROBLEM — Z91.89 AT RISK FOR ANEUPLOIDY: Chronic | Status: ACTIVE | Noted: 2023-10-28

## 2024-01-29 NOTE — PROGRESS NOTES
27 y/o  at 33 W here for NST which is reactive.   GDMA2 (likely pregestational  Obesity in pregnancy  CHTN - no meds, on ASA  Breech on last USN  MFM recommendations:  Limit weight gain to 11-20 pounds.  Weekly Maternal-Fetal Medicine review of capillary blood glucose values  Follow-up growth ultrasound every 4 weeks in the third trimester  Weekly NSTs at 32 weeks; twice weekly NST's at 34 weeks  Delivery at 38-39  weeks advised for medication controlled diabetes  Low-dose  mg (1.5 tabs) daily  Continue home BP monitoring  DW pt plan

## 2024-01-31 ENCOUNTER — TELEPHONE (OUTPATIENT)
Dept: PERINATAL CARE | Facility: HOSPITAL | Age: 29
End: 2024-01-31

## 2024-01-31 ENCOUNTER — OFFICE VISIT (OUTPATIENT)
Dept: FAMILY MEDICINE CLINIC | Facility: CLINIC | Age: 29
End: 2024-01-31
Payer: MEDICAID

## 2024-01-31 VITALS
DIASTOLIC BLOOD PRESSURE: 82 MMHG | HEIGHT: 67 IN | SYSTOLIC BLOOD PRESSURE: 129 MMHG | HEART RATE: 105 BPM | RESPIRATION RATE: 17 BRPM | BODY MASS INDEX: 36.57 KG/M2 | WEIGHT: 233 LBS

## 2024-01-31 DIAGNOSIS — Z13.0 SCREENING FOR DEFICIENCY ANEMIA: ICD-10-CM

## 2024-01-31 DIAGNOSIS — Z13.29 THYROID DISORDER SCREEN: ICD-10-CM

## 2024-01-31 DIAGNOSIS — Z13.220 LIPID SCREENING: ICD-10-CM

## 2024-01-31 DIAGNOSIS — Z00.00 WELLNESS EXAMINATION: Primary | ICD-10-CM

## 2024-01-31 DIAGNOSIS — L30.9 ECZEMA, UNSPECIFIED TYPE: ICD-10-CM

## 2024-01-31 DIAGNOSIS — O24.414 INSULIN CONTROLLED GESTATIONAL DIABETES MELLITUS (GDM) IN THIRD TRIMESTER: ICD-10-CM

## 2024-01-31 PROCEDURE — 3008F BODY MASS INDEX DOCD: CPT | Performed by: FAMILY MEDICINE

## 2024-01-31 PROCEDURE — 3079F DIAST BP 80-89 MM HG: CPT | Performed by: FAMILY MEDICINE

## 2024-01-31 PROCEDURE — 3074F SYST BP LT 130 MM HG: CPT | Performed by: FAMILY MEDICINE

## 2024-01-31 PROCEDURE — 99395 PREV VISIT EST AGE 18-39: CPT | Performed by: FAMILY MEDICINE

## 2024-01-31 NOTE — PROGRESS NOTES
HPI:   Mónica Earl is a 28 year old female who presents for an Annual Health Visit.   Patient was diagnosed with gestational diabetes, there have been some concerns that diabetes could be pregestational, also was noted that she had elevated blood pressure but currently levels have been controlled, she continues to follow with gynecology, has no new complaints reported at this time    Allergies:   No Known Allergies    CURRENT MEDICATIONS   Current Outpatient Medications   Medication Sig Dispense Refill    betamethasone 0.1 % External Cream Apply 1 Application topically 2 (two) times daily. 15 g 0    Insulin Lispro, 1 Unit Dial, (HUMALOG KWIKPEN) 100 UNIT/ML Subcutaneous Solution Pen-injector Inject 24 Units into the skin daily with dinner. 3 mL 5    aspirin (ASPIRIN LOW DOSE) 81 MG Oral Chew Tab Chew 1.5 tablets (121.5 mg total) by mouth daily. 60 tablet 2    Insulin Pen Needle 32G X 4 MM Does not apply Misc Inject 4 Needles into the skin in the morning, at noon, in the evening, and at bedtime. Use with flex pen as direct 200 each 2    insulin detemir (LEVEMIR FLEXPEN) 100 UNIT/ML Subcutaneous Solution Pen-injector Inject 14 Units into the skin every morning. Inject 74 units every night (divided in to two doses of 37 units each given at least two inches apart) 15 mL 1    Glucose Blood (ONETOUCH VERIO) In Vitro Strip Check blood sugar 4x daily. 300 strip 3    Lancets Does not apply Misc Check blood sugar 4x daily. 200 each 5    Blood Glucose Monitoring Suppl (ONETOUCH VERIO FLEX SYSTEM) w/Device Does not apply Kit 1 kit 4 (four) times daily. 1 kit 0    Glucose Blood (ONETOUCH VERIO) In Vitro Strip 1 strip 4 (four) times daily. Use daily as directed. 100 strip 2    OneTouch Delica Lancets 33G Does not apply Misc 1 Lancet by Finger stick route daily. May substitute for covered brand only if prescribed brand os not covered. 100 each 2    Blood Pressure Monitoring (BLOOD PRESSURE CUFF) Does not apply Misc Take  blood pressure once daily and record reading 1 each 0    Multiple Vitamins-Minerals (MULTIVITAL OR) Take by mouth.        HISTORICAL INFORMATION   Past Medical History:   Diagnosis Date    Anemia     Pre-diabetes     Early A1C 5.7. 3hr GTT ordered      History reviewed. No pertinent surgical history.   Family History   Problem Relation Age of Onset    Thyroid disease Mother     Diabetes Maternal Grandmother     Diabetes Paternal Grandmother     Breast Cancer Paternal Grandmother 62        62    Hypertension Other         Family h/o Hypertension    Thyroid disease Other         Family h/o Thyroid Disease    Other (Other) Other         No Family h    Asthma Other         Family h/o Asthma    Diabetes Other         Family h/o Diabetes    Heart Disease Other         Family h/o Coronary Artery Disease    Glaucoma Neg       SOCIAL HISTORY   Social History     Socioeconomic History    Marital status: Single   Tobacco Use    Smoking status: Never    Smokeless tobacco: Never   Vaping Use    Vaping Use: Never used   Substance and Sexual Activity    Alcohol use: No     Alcohol/week: 0.0 standard drinks of alcohol    Drug use: No    Sexual activity: Yes     Birth control/protection: Implant     Comment: Nexplanon 2018   Other Topics Concern    Caffeine Concern No    Special Diet No    Exercise Yes    Seat Belt Yes     Social Determinants of Health     Financial Resource Strain: Low Risk  (9/12/2023)    Financial Resource Strain     Difficulty of Paying Living Expenses: Not hard at all     Med Affordability: No   Food Insecurity: No Food Insecurity (9/12/2023)    Food Insecurity     Food Insecurity: Never true   Transportation Needs: No Transportation Needs (9/12/2023)    Transportation Needs     Lack of Transportation: No   Stress: No Stress Concern Present (9/12/2023)    Stress     Feeling of Stress : No   Housing Stability: Low Risk  (9/12/2023)    Housing Stability     Housing Instability: No     Social History     Social  History Narrative    Not on file        REVIEW OF SYSTEMS:     Review of Systems   Constitutional: Negative.    Respiratory: Negative.     Cardiovascular: Negative.    Skin: Negative.    Neurological: Negative.          EXAM:   /82   Pulse 105   Resp 17   Ht 5' 7\" (1.702 m)   Wt 233 lb (105.7 kg)   LMP 06/08/2023 (Exact Date)   BMI 36.49 kg/m²    Wt Readings from Last 6 Encounters:   01/31/24 233 lb (105.7 kg)   01/29/24 233 lb (105.7 kg)   01/24/24 235 lb (106.6 kg)   01/23/24 235 lb (106.6 kg)   12/27/23 230 lb (104.3 kg)   12/27/23 232 lb (105.2 kg)     Body mass index is 36.49 kg/m².    Physical Exam  Vitals and nursing note reviewed.   Constitutional:       Appearance: She is well-developed.   Cardiovascular:      Rate and Rhythm: Normal rate and regular rhythm.      Heart sounds: Normal heart sounds.   Pulmonary:      Effort: Pulmonary effort is normal.      Breath sounds: Normal breath sounds.   Abdominal:      General: Bowel sounds are normal.      Palpations: Abdomen is soft.      Comments: Gravid uterus   Skin:     General: Skin is warm and dry.      Comments: Right hand with excoriations, hypopigmentation and scaling   Neurological:      Mental Status: She is alert and oriented to person, place, and time.      Deep Tendon Reflexes: Reflexes are normal and symmetric.          ASSESSMENT AND PLAN:   Mónica was seen today for physical.    Diagnoses and all orders for this visit:    Wellness examination  -     Comp Metabolic Panel (14); Future  -     CBC With Differential With Platelet; Future  -     Lipid Panel; Future  -     TSH W Reflex To Free T4; Future  -     Hemoglobin A1C; Future    Insulin controlled gestational diabetes mellitus (GDM) in third trimester  -     Hemoglobin A1C; Future    Lipid screening  -     Lipid Panel; Future    Screening for deficiency anemia  -     CBC With Differential With Platelet; Future    Thyroid disorder screen  -     TSH W Reflex To Free T4;  Future    Eczema, unspecified type  -     betamethasone 0.1 % External Cream; Apply 1 Application topically 2 (two) times daily.    Patient will complete blood work after delivery and will return to clinic 4 weeks after delivery with glucometer to determine further plan of care and verify glucose levels count, glucose in 2021 was normal, she got pregnant in July and completed hemoglobin A1c in September in the prediabetes range, suspect prediabetes but not necessarily diabetes before pregnancy      The patient indicates understanding of these issues and agrees to the plan.    Problem List:  Patient Active Problem List   Diagnosis    Chronic hypertension affecting pregnancy    GDM, likely pregestational    Obesity affecting pregnancy in third trimester    At risk for aneuploidy - high risk ffDNA    Breech presentation       Nanci Dsouza MD  1/31/2024  10:14 AM

## 2024-02-01 ENCOUNTER — TELEPHONE (OUTPATIENT)
Dept: PERINATAL CARE | Facility: HOSPITAL | Age: 29
End: 2024-02-01

## 2024-02-05 ENCOUNTER — TELEPHONE (OUTPATIENT)
Dept: PERINATAL CARE | Facility: HOSPITAL | Age: 29
End: 2024-02-05

## 2024-02-12 ENCOUNTER — TELEPHONE (OUTPATIENT)
Dept: OBGYN CLINIC | Facility: CLINIC | Age: 29
End: 2024-02-12

## 2024-02-12 ENCOUNTER — TELEPHONE (OUTPATIENT)
Dept: PERINATAL CARE | Facility: HOSPITAL | Age: 29
End: 2024-02-12

## 2024-02-12 RX ORDER — INSULIN LISPRO 100 [IU]/ML
30 INJECTION, SOLUTION INTRAVENOUS; SUBCUTANEOUS
Qty: 3 ML | Refills: 5 | Status: SHIPPED | OUTPATIENT
Start: 2024-02-12

## 2024-02-12 NOTE — TELEPHONE ENCOUNTER
Levemir 14 units subcutaneous every morning  Levemir 74 units (divided into 2 injections of 37 units each placed at least 2 inches apart) subcutaneous every night with bedtime snack      novolog 30 units with dinner.

## 2024-02-12 NOTE — TELEPHONE ENCOUNTER
Pt  35 4/7 weeks  Blood glucose log from   1/31-2/8 (Last review of sugars was 1/24)       Low  High Out of Range   Fasting Blood Sugar 94 100 1/9   Post Breakfast 96 104 0/9   Post Lunch 102 112 0/9   Post Dinner 115 126 8/9       Taking     Levemir 14 units subcutaneous every morning  Levemir 74 units (divided into 2 injections of 37 units each placed at least 2 inches apart) subcutaneous every night with bedtime snack      novolog 24 units with dinner.

## 2024-02-19 ENCOUNTER — TELEPHONE (OUTPATIENT)
Dept: PERINATAL CARE | Facility: HOSPITAL | Age: 29
End: 2024-02-19

## 2024-02-19 ENCOUNTER — ROUTINE PRENATAL (OUTPATIENT)
Dept: OBGYN CLINIC | Facility: CLINIC | Age: 29
End: 2024-02-19
Payer: MEDICAID

## 2024-02-19 ENCOUNTER — LAB ENCOUNTER (OUTPATIENT)
Dept: LAB | Facility: REFERENCE LAB | Age: 29
End: 2024-02-19
Attending: OBSTETRICS & GYNECOLOGY
Payer: MEDICAID

## 2024-02-19 VITALS
SYSTOLIC BLOOD PRESSURE: 146 MMHG | HEIGHT: 67 IN | WEIGHT: 236.13 LBS | DIASTOLIC BLOOD PRESSURE: 82 MMHG | BODY MASS INDEX: 37.06 KG/M2

## 2024-02-19 DIAGNOSIS — O16.9 ELEVATED BLOOD PRESSURE AFFECTING PREGNANCY, ANTEPARTUM: ICD-10-CM

## 2024-02-19 DIAGNOSIS — O09.93 SUPERVISION OF HIGH RISK PREGNANCY IN THIRD TRIMESTER: Primary | ICD-10-CM

## 2024-02-19 DIAGNOSIS — O09.93 SUPERVISION OF HIGH RISK PREGNANCY IN THIRD TRIMESTER (HCC): ICD-10-CM

## 2024-02-19 DIAGNOSIS — O16.9 ELEVATED BLOOD PRESSURE AFFECTING PREGNANCY, ANTEPARTUM (HCC): ICD-10-CM

## 2024-02-19 LAB
ALBUMIN SERPL-MCNC: 3.9 G/DL (ref 3.2–4.8)
ALBUMIN/GLOB SERPL: 1.2 {RATIO} (ref 1–2)
ALP LIVER SERPL-CCNC: 144 U/L
ALT SERPL-CCNC: 10 U/L
ANION GAP SERPL CALC-SCNC: 9 MMOL/L (ref 0–18)
AST SERPL-CCNC: 14 U/L (ref ?–34)
BASOPHILS # BLD AUTO: 0.03 X10(3) UL (ref 0–0.2)
BASOPHILS NFR BLD AUTO: 0.2 %
BILIRUB SERPL-MCNC: 0.4 MG/DL (ref 0.3–1.2)
BUN BLD-MCNC: <5 MG/DL (ref 9–23)
CALCIUM BLD-MCNC: 9.3 MG/DL (ref 8.7–10.4)
CHLORIDE SERPL-SCNC: 108 MMOL/L (ref 98–112)
CO2 SERPL-SCNC: 21 MMOL/L (ref 21–32)
CREAT BLD-MCNC: 0.6 MG/DL
CREAT UR-SCNC: 188.4 MG/DL
DEPRECATED RDW RBC AUTO: 44.8 FL (ref 35.1–46.3)
EGFRCR SERPLBLD CKD-EPI 2021: 125 ML/MIN/1.73M2 (ref 60–?)
EOSINOPHIL # BLD AUTO: 0.05 X10(3) UL (ref 0–0.7)
EOSINOPHIL NFR BLD AUTO: 0.3 %
ERYTHROCYTE [DISTWIDTH] IN BLOOD BY AUTOMATED COUNT: 15.7 % (ref 11–15)
FASTING STATUS PATIENT QL REPORTED: NO
GLOBULIN PLAS-MCNC: 3.2 G/DL (ref 2.8–4.4)
GLUCOSE BLD-MCNC: 128 MG/DL (ref 70–99)
HCT VFR BLD AUTO: 34.7 %
HGB BLD-MCNC: 10.8 G/DL
IMM GRANULOCYTES # BLD AUTO: 0.11 X10(3) UL (ref 0–1)
IMM GRANULOCYTES NFR BLD: 0.7 %
LYMPHOCYTES # BLD AUTO: 2.04 X10(3) UL (ref 1–4)
LYMPHOCYTES NFR BLD AUTO: 12.7 %
MCH RBC QN AUTO: 24.5 PG (ref 26–34)
MCHC RBC AUTO-ENTMCNC: 31.1 G/DL (ref 31–37)
MCV RBC AUTO: 78.9 FL
MONOCYTES # BLD AUTO: 1.3 X10(3) UL (ref 0.1–1)
MONOCYTES NFR BLD AUTO: 8.1 %
NEUTROPHILS # BLD AUTO: 12.54 X10 (3) UL (ref 1.5–7.7)
NEUTROPHILS # BLD AUTO: 12.54 X10(3) UL (ref 1.5–7.7)
NEUTROPHILS NFR BLD AUTO: 78 %
PLATELET # BLD AUTO: 266 10(3)UL (ref 150–450)
POTASSIUM SERPL-SCNC: 3.8 MMOL/L (ref 3.5–5.1)
PROT SERPL-MCNC: 7.1 G/DL (ref 5.7–8.2)
PROT UR-MCNC: 26.1 MG/DL (ref ?–14)
PROT/CREAT UR-RTO: 0.14
RBC # BLD AUTO: 4.4 X10(6)UL
SODIUM SERPL-SCNC: 138 MMOL/L (ref 136–145)
T PALLIDUM AB SER QL IA: NONREACTIVE
WBC # BLD AUTO: 16.1 X10(3) UL (ref 4–11)

## 2024-02-19 PROCEDURE — 80053 COMPREHEN METABOLIC PANEL: CPT

## 2024-02-19 PROCEDURE — 82570 ASSAY OF URINE CREATININE: CPT

## 2024-02-19 PROCEDURE — 86780 TREPONEMA PALLIDUM: CPT

## 2024-02-19 PROCEDURE — 87389 HIV-1 AG W/HIV-1&-2 AB AG IA: CPT

## 2024-02-19 PROCEDURE — 85025 COMPLETE CBC W/AUTO DIFF WBC: CPT

## 2024-02-19 PROCEDURE — 87150 DNA/RNA AMPLIFIED PROBE: CPT | Performed by: OBSTETRICS & GYNECOLOGY

## 2024-02-19 PROCEDURE — 99215 OFFICE O/P EST HI 40 MIN: CPT | Performed by: OBSTETRICS & GYNECOLOGY

## 2024-02-19 PROCEDURE — 87081 CULTURE SCREEN ONLY: CPT | Performed by: OBSTETRICS & GYNECOLOGY

## 2024-02-19 PROCEDURE — 84156 ASSAY OF PROTEIN URINE: CPT

## 2024-02-19 PROCEDURE — 59025 FETAL NON-STRESS TEST: CPT | Performed by: OBSTETRICS & GYNECOLOGY

## 2024-02-19 PROCEDURE — 36415 COLL VENOUS BLD VENIPUNCTURE: CPT

## 2024-02-19 NOTE — PROGRESS NOTES
Doing well. No OB complaints. +FM.   Blood presure elevated. Labs today.   GBS today.   Discussed elevated blood presure today and at 32 weeks giving diagnosis of gestational HTN. Recommend delivery at 37 weeks.   Bedside ultrasound breech. Recommend ECV induction of labor Thursday. Patient to contemplate and call when decided.    If calls please schedule for ECV possible  on Thursday.     SHELL 1 weeks.     Dr. Jc Leyva MD    [facility]     This note was created by Dragon voice recognition. Errors in content may be related to improper recognition by the system; efforts to review and correct have been done but errors may still exist. Please be advised the primary purpose of this note is for me to communicate medical care. Standard sentence structure is not always used. Medical terminology and medical abbreviations may be used. There may be grammatical, typographical, and automated fill ins that may have errors missed in proofreading.       Total patient time was 40 minutes in evaluation and management.

## 2024-02-20 ENCOUNTER — TELEPHONE (OUTPATIENT)
Dept: OBGYN CLINIC | Facility: CLINIC | Age: 29
End: 2024-02-20

## 2024-02-20 LAB — GROUP B STREP BY PCR FOR PCR OVT: POSITIVE

## 2024-02-20 NOTE — TELEPHONE ENCOUNTER
Received a call from patient to inform she will like to proceed  with ECV possible   .    Patient will like to know the next steps .  Please assist

## 2024-02-20 NOTE — TELEPHONE ENCOUNTER
----- Message from Jc Leyva MD sent at 2024 11:42 AM CST -----  Regarding: RE: Please schedule for surgery  Thank you.     Dr. Leyva     ----- Message -----  From: Mignon Cazares  Sent: 2024  10:04 AM CST  To: Jc Leyva MD  Subject: RE: Please schedule for surgery                  Rigodon, its at 930, not 1130...  ----- Message -----  From: Jc Leyva MD  Sent: 2024  10:01 AM CST  To: [place] [id number] [facility] [place] [facility]  Subject: Please schedule for surgery                        Please schedule the following surgery:    Procedure: ECV possible  section versus induction of labor   Assist: Yes- Usual suspects.   Date:   24                                   Time Requested: Following 730 case  Dx: Breech presentation  Pre-op appt: No   Admission type: In  Department of discharge: Floor  Expected length of stay: days   Procedure length time (please enter amount you are requesting): 1 hours   Recovery time: 6-8 weeks  Medical Clearance: No  Post- Op f/u appt time frame: 6 weeks         ALL Medicaid/including BCBS community: Tubal/ Hyst form MUST be signed (30 days): N/a      Message to nurses: None    Thank you.     Dr. Leyva

## 2024-02-21 ENCOUNTER — HOSPITAL ENCOUNTER (OUTPATIENT)
Dept: PERINATAL CARE | Facility: HOSPITAL | Age: 29
Discharge: HOME | End: 2024-02-21
Attending: OBSTETRICS & GYNECOLOGY
Payer: MEDICAID

## 2024-02-21 ENCOUNTER — HOSPITAL ENCOUNTER (OUTPATIENT)
Dept: PERINATAL CARE | Facility: HOSPITAL | Age: 29
Discharge: HOME | End: 2024-02-21
Attending: FAMILY MEDICINE
Payer: MEDICAID

## 2024-02-21 ENCOUNTER — ROUTINE PRENATAL (OUTPATIENT)
Dept: OBGYN CLINIC | Facility: CLINIC | Age: 29
End: 2024-02-21
Payer: MEDICAID

## 2024-02-21 ENCOUNTER — TELEPHONE (OUTPATIENT)
Dept: OBGYN CLINIC | Facility: CLINIC | Age: 29
End: 2024-02-21

## 2024-02-21 VITALS
SYSTOLIC BLOOD PRESSURE: 129 MMHG | WEIGHT: 236 LBS | HEART RATE: 101 BPM | DIASTOLIC BLOOD PRESSURE: 73 MMHG | BODY MASS INDEX: 37 KG/M2

## 2024-02-21 VITALS — DIASTOLIC BLOOD PRESSURE: 70 MMHG | HEIGHT: 67 IN | BODY MASS INDEX: 37 KG/M2 | SYSTOLIC BLOOD PRESSURE: 122 MMHG

## 2024-02-21 DIAGNOSIS — O10.919 CHRONIC HYPERTENSION AFFECTING PREGNANCY (HCC): Chronic | ICD-10-CM

## 2024-02-21 DIAGNOSIS — O32.0XX0 UNSTABLE FETAL LIE, SINGLE OR UNSPECIFIED FETUS (HCC): ICD-10-CM

## 2024-02-21 DIAGNOSIS — O99.213 OBESITY AFFECTING PREGNANCY IN THIRD TRIMESTER, UNSPECIFIED OBESITY TYPE (HCC): Primary | Chronic | ICD-10-CM

## 2024-02-21 DIAGNOSIS — O24.419 GDM, CLASS A2 (HCC): Chronic | ICD-10-CM

## 2024-02-21 DIAGNOSIS — O99.213 OBESITY AFFECTING PREGNANCY IN THIRD TRIMESTER, UNSPECIFIED OBESITY TYPE (HCC): Chronic | ICD-10-CM

## 2024-02-21 DIAGNOSIS — O10.919 CHRONIC HYPERTENSION AFFECTING PREGNANCY (HCC): Primary | Chronic | ICD-10-CM

## 2024-02-21 PROCEDURE — 76819 FETAL BIOPHYS PROFIL W/O NST: CPT

## 2024-02-21 PROCEDURE — 99214 OFFICE O/P EST MOD 30 MIN: CPT | Performed by: OBSTETRICS & GYNECOLOGY

## 2024-02-21 PROCEDURE — 76816 OB US FOLLOW-UP PER FETUS: CPT | Performed by: OBSTETRICS & GYNECOLOGY

## 2024-02-21 PROCEDURE — 59025 FETAL NON-STRESS TEST: CPT | Performed by: OBSTETRICS & GYNECOLOGY

## 2024-02-21 NOTE — PROGRESS NOTES
Doing well. No OB complaints. +FM.   Unstable fetal lie, cephalic today. Planning for induction of labor below.   Reviewed blood presure and noted elevated at 13 weeks, 32 weeks, and 36 weeks.   Reviewed diagnosis of chronic HTN rather than gestational HTN. Discussed with MFM on call who agreed with induction of labor at 38 weeks for chronic HTN with GDMA2.   Requested for induction of labor at 38 weeks 2/29/24.   NST reactive.     SHELL early next week for NST     Dr. Jc Leyva MD    [facility]     This note was created by AVST voice recognition. Errors in content may be related to improper recognition by the system; efforts to review and correct have been done but errors may still exist. Please be advised the primary purpose of this note is for me to communicate medical care. Standard sentence structure is not always used. Medical terminology and medical abbreviations may be used. There may be grammatical, typographical, and automated fill ins that may have errors missed in proofreading.       Total patient time was 30 minutes in evaluation and management.

## 2024-02-21 NOTE — TELEPHONE ENCOUNTER
Pt called had visit with MFM and ultrasound fetus vertex presentation wants to cancel ECV tomorrow. Paged Dr. Leyva and informed/ordered to call ECV.  Called [facility] ECV cancelled.       Called pt informed of cancelled ECV.  Pt needs repeat NST and schedule for IOL therefore scheduled with JN today to discuss. Informed to come early for the NST.  Pt agrees.

## 2024-02-21 NOTE — PROGRESS NOTES
Outpatient Maternal-Fetal Medicine Consultation    Dear  Dr. Mayers    Thank you for requesting ultrasound evaluation and maternal fetal medicine consultation on your patient Mónica Earl.  As you are aware she is a 28 year old female  with a braden pregnancy and an Estimated Date of Delivery: 3/14/24.  A maternal-fetal medicine f/u is today.  Her prenatal records and labs were reviewed.    HISTORY  # 1 - Date: 08/10/14, Sex: Female, Weight: 5 lb 15 oz (2.693 kg), GA: 38w0d, Delivery: Normal spontaneous vaginal delivery, Apgar1: None, Apgar5: None, Living: Living, Birth Comments: None    # 2 - Date: 16, Sex: Female, Weight: 7 lb 5 oz (3.317 kg), GA: 38w1d, Delivery: Normal spontaneous vaginal delivery, Apgar1: 8, Apgar5: 9, Living: Living, Birth Comments: None    # 3 - Date: None, Sex: None, Weight: None, GA: None, Delivery: None, Apgar1: None, Apgar5: None, Living: None, Birth Comments: None      Past Medical History  The patient  has a past medical history of Anemia and Pre-diabetes.    Past Surgical History  The patient  has no past surgical history on file.    Family History  The patient She indicated that her mother is alive. She indicated that her father is alive. She indicated that her brother is alive. She indicated that her maternal grandmother is alive. She indicated that her paternal grandmother is alive. She indicated that the status of her neg is unknown.      Medications:   Current Outpatient Medications:     Insulin Lispro, 1 Unit Dial, (HUMALOG KWIKPEN) 100 UNIT/ML Subcutaneous Solution Pen-injector, Inject 30 Units into the skin daily with dinner., Disp: 3 mL, Rfl: 5    betamethasone 0.1 % External Cream, Apply 1 Application topically 2 (two) times daily., Disp: 15 g, Rfl: 0    aspirin (ASPIRIN LOW DOSE) 81 MG Oral Chew Tab, Chew 1.5 tablets (121.5 mg total) by mouth daily., Disp: 60 tablet, Rfl: 2    Insulin Pen Needle 32G X 4 MM Does not apply Misc, Inject 4 Needles into the  skin in the morning, at noon, in the evening, and at bedtime. Use with flex pen as direct, Disp: 200 each, Rfl: 2    insulin detemir (LEVEMIR FLEXPEN) 100 UNIT/ML Subcutaneous Solution Pen-injector, Inject 14 Units into the skin every morning. Inject 74 units every night (divided in to two doses of 37 units each given at least two inches apart), Disp: 15 mL, Rfl: 1    Glucose Blood (ONETOUCH VERIO) In Vitro Strip, Check blood sugar 4x daily., Disp: 300 strip, Rfl: 3    Lancets Does not apply Misc, Check blood sugar 4x daily., Disp: 200 each, Rfl: 5    Blood Glucose Monitoring Suppl (ONETOUCH VERIO FLEX SYSTEM) w/Device Does not apply Kit, 1 kit 4 (four) times daily., Disp: 1 kit, Rfl: 0    Glucose Blood (ONETOUCH VERIO) In Vitro Strip, 1 strip 4 (four) times daily. Use daily as directed., Disp: 100 strip, Rfl: 2    OneTouch Delica Lancets 33G Does not apply Misc, 1 Lancet by Finger stick route daily. May substitute for covered brand only if prescribed brand os not covered., Disp: 100 each, Rfl: 2    Blood Pressure Monitoring (BLOOD PRESSURE CUFF) Does not apply Misc, Take blood pressure once daily and record reading, Disp: 1 each, Rfl: 0    Multiple Vitamins-Minerals (MULTIVITAL OR), Take by mouth., Disp: , Rfl:   Allergies: No Known Allergies    PHYSICAL EXAMINATION:  /73   Pulse 101   Wt 236 lb (107 kg)   LMP 06/08/2023 (Exact Date)   BMI 36.96 kg/m²     General: alert and oriented,no acute distress  Abdomen: gravid, soft, non-tender      DISCUSSION  During her visit we discussed and reviewed the following issues:  NON-DIAGNOSTIC CELL-FREE FETAL DNA RESULTS  History  Low fetal fraction on NIPT.  Lorelei criteria algorithm suggested an elevated risk for triploidy/trisomy 13/trisomy 18.    The lab reported that cfDNA was insufficient in the maternal serum to report an accurate NIPT evaluation.  SUMMARY  The patient is confident that she would not interrupt a pregnancy due to fetal aneuploidy and is aware of  the limitations of ultrasound alone with respect to the diagnosis of fetal aneuploidy, hence she DECLINES invasive testing.  See previous note    OBESITY  This patient has obesity; her pre-gravid BMI is: 37  Obesity during pregnancy is associated with numerous maternal and  risks.  It is not clear whether obesity is a direct cause of adverse pregnancy outcome or whether the association between obesity and adverse pregnancy outcome is due to factors such as diabetes mellitus.   Data suggest that obese women should be encouraged to undertake a weight reduction program (diet, exercise, behavior modification, and possibly bariatric surgery in some cases) prior to attempting to conceive.            GESTATIONAL DIABETES    3 hour GTT  Lab Results   Component Value Date    GLUFG 96 (H) 2023    GLU1G 195 (H) 2023    GLU2G 179 (H) 2023    GLU3G 124 2023      Please see previous MFM detailed discussion.     Her capillary blood glucose records were reviewed today; her compliance with the recommended four times daily assessments (fasting and two-hour post-prandial) is good.    Her overall glucose control is good.      The patient is presently on diet therapy; her compliance with her diabetic diet regimen was reviewed and it is adequate.     Medical Regimen Recommendation:   Continue ADA diet    INSULIN:  Levemir 14 units subcutaneous every morning  Levemir 74 units (divided into 2 injections of 37 units each placed at least 2 inches apart) subcutaneous every night with bedtime snack      novolog 30 units with dinner.       CHANGE INSULIN:  Levemir 14 units subcutaneous every morning  Levemir 76 units (divided into 2 injections of 37 units each placed at least 2 inches apart) subcutaneous every night with bedtime snack      novolog 32 units with dinner.      OB ULTRASOUND REPORT   See imaging tab for complete ultrasound report or in PACS    Single IUP in cephalic presentation.    Placenta is  posterior.   A 3 vessel cord is noted.  Cardiac activity is present at 130 bpm  EFW 3095 g ( 6 lb 13 oz); 56%.    SIMONE is  12.9 cm.  MVP is 5.3 cm    BIOPHYSICAL PROFILE:  Movement:    2/2  Tone:            2/2  Breathin/2  Fluid:             2/2  TOTAL:                 IMPRESSION:  IUP at 36w6d   Cephalic position  NIPT with low fetal fraction   GDMA2 - early diagnosis  Class II Obesity    RECOMMENDATIONS:  Continue care with Dr. Mayers  Weekly Maternal-Fetal Medicine review of capillary blood glucose values  NSTs  twice weekly  Delivery at 38-39  weeks advised for medication controlled diabetes  Continue home BP monitoring    Thank you for allowing me to participate in the care of your patient.  Please do not hesitate to contact me if additional questions or concerns arise.      Lewis Gonzalez D.O.  Maternal Fetal Medicine     30  minutes spent in review of records, patient consultation, documentation and coordination of care.  The relevant clinical matter(s) are summarized above.     Note to patient and family  The 21st Century Cures Act makes medical notes available to patients in the interest of transparency.  However, please be advised that this is a medical document.  It is intended as [id number] communication.  It is written and medical language may contain abbreviations or verbiage that are technical and unfamiliar.  It may appear blunt or direct.  Medical documents are intended to carry relevant information, facts as evident, and the clinical opinion of the practitioner.

## 2024-02-22 ENCOUNTER — TELEPHONE (OUTPATIENT)
Dept: OBGYN CLINIC | Facility: CLINIC | Age: 29
End: 2024-02-22

## 2024-02-22 NOTE — TELEPHONE ENCOUNTER
----- Message from Jc Leyva MD sent at 2/21/2024  5:09 PM CST -----  Regarding: Please schedule for IOL  MAGALYS Duvall multip with chronic HTN GDMA2 coming in for induction of labor on your day.     Please schedule the following surgery:    Procedure: induction of labor   Assist: No  Date:   2/29/24                                   Time Requested: 0800 or 0900   Dx: Chronic HTN, GDMA2.   Pre-op appt: No   Admission type: In  Department of discharge: Floor  Expected length of stay:  days   Procedure length time (please enter amount you are requesting):  hours   Recovery time: 6-8 weeks  Medical Clearance: No  Post- Op f/u appt time frame: 6 weeks         ALL Medicaid/including BCBS community: Tubal/ Hyst form MUST be signed (30 days): N/a      Message to nurses: None    Thank you.     Dr. Leyva

## 2024-02-27 ENCOUNTER — ROUTINE PRENATAL (OUTPATIENT)
Dept: OBGYN CLINIC | Facility: CLINIC | Age: 29
End: 2024-02-27
Payer: MEDICAID

## 2024-02-27 VITALS
DIASTOLIC BLOOD PRESSURE: 74 MMHG | BODY MASS INDEX: 37.42 KG/M2 | SYSTOLIC BLOOD PRESSURE: 122 MMHG | WEIGHT: 238.38 LBS | HEIGHT: 67 IN

## 2024-02-27 DIAGNOSIS — O10.919 CHRONIC HYPERTENSION AFFECTING PREGNANCY (HCC): Primary | ICD-10-CM

## 2024-02-27 PROCEDURE — 59025 FETAL NON-STRESS TEST: CPT | Performed by: OBSTETRICS & GYNECOLOGY

## 2024-02-27 PROCEDURE — 99214 OFFICE O/P EST MOD 30 MIN: CPT | Performed by: OBSTETRICS & GYNECOLOGY

## 2024-02-27 NOTE — PROGRESS NOTES
28 year old  at 37w5d     Contractions: No  VB: No  LOF: No  Fetal movement: Yes    Return OB  Pre-Barbara Care: UTD. Induction of labor this week. Instructions reviewed.       Cephalic by BSUS      GDM - likely pregestational  - Following with MFM  - weekly NST - reactive today, start twice weekly 34 wks  Chronic HTN  - no medications  - cont baby ASA  - scheduled for induction of labor this week    Patient Active Problem List   Diagnosis    Chronic hypertension affecting pregnancy (HCC)    GDM, likely pregestational    Obesity affecting pregnancy in third trimester (HCC)    At risk for aneuploidy - high risk ffDNA    Breech presentation (HCC)    Unstable fetal lie (HCC)         30 min spent with chart review, obtaining history, performing exam/ultrasound and counseling, coordination of care  Noemi Mayers MD

## 2024-02-27 NOTE — PATIENT INSTRUCTIONS
Induction of Labor- Patient Information   Labor is the process that leads to the birth of a baby. This process happens when you start to have contractions and your body starts to open the cervix (the opening of the uterus where the baby will deliver through). The exact day or time that a baby will be born is not able to be predicted. We also do not always know why some people go into labor at a certain point in their pregnancy and others do not.  Sometimes we offer or recommend inducing your labor, which is using medicine or other methods to make labor start.  Reasons You May have Labor Induced  1) Medical reasons for inducing your labor:  a) Health problems such as diabetes, high blood pressure, heart, or kidney disease  b) Placental abruption (placenta starts to separate from the uterus), premature rupture of membranes (water breaking early)  c) Problems with baby, such as low weight or low fluid  2) Induction the week before your due date in low risk patients delivering for the first time.  3) Induction for patients who have had a vaginal delivery in the past and would like to have an induction any time after 39 weeks.  4) Induction one week past the due date.  What to expect  “Induction of labor” is the use of medications or other methods to make labor start.  If you are starting off with a closed cervix or open only 1-2 centimeters (cm), the induction process could take longer than if the cervix is open further. We may start with a balloon or other medications to slowly open the cervix for the first 12-24 hours (or longer). Pitocin is a medication that is then used to bring on contractions. The process of induction and bringing on labor can be very long. Sometimes it can take days.  There are a variety of medications available for pain. You may take Tylenol or narcotic pills, or get IV narcotic pain medication. Epidurals are also an option for most patients.  You will need a Covid test prior to your scheduled  arrival to the hospital. To get to Labor & Delivery, take the East elevators up to the 3rd floor. A nurse will get you registered and once you are settled into your room and everything is ready, the doctor will be paged. The doctor will confirm your medical history, do an ultrasound to confirm that your baby is still head down, and perform a cervical exam to see how open your cervix is This will help decide which induction medicine to use.   Why you may need a  Section ()  1) Your baby is in distress. This means the baby is not getting enough oxygen or its heartbeat may not be regular.  2) If your cervix does not open beyond a certain number of centimeters despite receiving medications  4) If your cervix opens to 10cm and pushing is started, but the baby's head does not move down in the pelvis with pushing.     Induction method Timing How it works   Cervidil*   Remains for 12 hours This medication is placed in the vagina. It is used to help soften a closed cervix. If the cervix remains closed, you may need a second cervidil for another 12 hours.   Balloon (quintana/Cooks®) Removed after 12 hours or earlier  The balloon is placed past the cervix and filled with water. This stretches and thins out the cervix. When the balloon comes out, the cervix is typically open about 3-4cm.   Cytotec* Placed in vagina every 4h This medication helps to soften the cervix and can be used with the balloon.   Pitocin Continuous Usually when the balloon comes out, the cervix is ready for labor and starting pitocin. Sometimes an additional dose of cytotec may be required. Pitocin makes contractions stronger and closer together.   Amniotomy or rupture of membranes   Breaking your water (also called amniotomy or rupturing the membranes) is another method of induction of labor.   *these methods cannot be used if you have had a prior  because they increase the rate of complications (such as uterine rupture, or tearing  of the uterus through the previous scar)  What to bring?  Change of clothes for yourself for discharge  Clothes for the baby to go home in  Car-seat to take baby home in  Phone/tablet/laptop to help pass the time (don't forget the !)  We will provide toiletries and you will be given hospital gowns to wear, however you may bring your own if you choose, which you would be able to wear after delivery.  Logistics  A request will be placed for your scheduled induction and you will be notified of the date and time once the case is scheduled. You will need a Covid test within 3 days prior to your scheduled arrival to the hospital.   Of note, your induction may be delayed due to patient census on labor and delivery. Patients are seen in the order of acuity so elective inductions are the first patients to be delayed. Please call Labor and Delivery at [phone] at least 1 hour prior to your scheduled time to make sure they are able to accommodate you. This is necessary to provide you the highest quality care. You may eat and drink prior to your induction.  The easiest way to get to Labor and Delivery (3rd floor) is from the main or east entrance [address], [place] [address]. This entrance is always open and staff will be there to assist you.    You must have a car seat to bring baby home.

## 2024-02-28 ENCOUNTER — TELEPHONE (OUTPATIENT)
Dept: OBGYN UNIT | Facility: HOSPITAL | Age: 29
End: 2024-02-28

## 2024-02-29 ENCOUNTER — ANESTHESIA (OUTPATIENT)
Dept: OBGYN UNIT | Facility: HOSPITAL | Age: 29
End: 2024-02-29
Payer: MEDICAID

## 2024-02-29 ENCOUNTER — APPOINTMENT (OUTPATIENT)
Dept: OBGYN CLINIC | Facility: HOSPITAL | Age: 29
End: 2024-02-29
Attending: OBSTETRICS & GYNECOLOGY
Payer: MEDICAID

## 2024-02-29 ENCOUNTER — HOSPITAL ENCOUNTER (INPATIENT)
Facility: HOSPITAL | Age: 29
LOS: 2 days | Discharge: HOME | End: 2024-03-02
Attending: OBSTETRICS & GYNECOLOGY | Admitting: OBSTETRICS & GYNECOLOGY
Payer: MEDICAID

## 2024-02-29 ENCOUNTER — ANESTHESIA EVENT (OUTPATIENT)
Dept: OBGYN UNIT | Facility: HOSPITAL | Age: 29
End: 2024-02-29
Payer: MEDICAID

## 2024-02-29 PROBLEM — Z34.90 PREGNANCY (HCC): Status: ACTIVE | Noted: 2024-02-29

## 2024-02-29 PROBLEM — Z34.90 ENCOUNTER FOR INDUCTION OF LABOR (HCC): Status: ACTIVE | Noted: 2024-02-29

## 2024-02-29 LAB
ALBUMIN SERPL-MCNC: 3.7 G/DL (ref 3.2–4.8)
ALBUMIN/GLOB SERPL: 1.1 {RATIO} (ref 1–2)
ALP LIVER SERPL-CCNC: 135 U/L
ALT SERPL-CCNC: 11 U/L
ANION GAP SERPL CALC-SCNC: 9 MMOL/L (ref 0–18)
ANTIBODY SCREEN: NEGATIVE
AST SERPL-CCNC: 15 U/L (ref ?–34)
BASOPHILS # BLD AUTO: 0.04 X10(3) UL (ref 0–0.2)
BASOPHILS NFR BLD AUTO: 0.3 %
BILIRUB SERPL-MCNC: 0.3 MG/DL (ref 0.3–1.2)
BUN BLD-MCNC: 6 MG/DL (ref 9–23)
BUN/CREAT SERPL: 11.3 (ref 10–20)
CALCIUM BLD-MCNC: 9.1 MG/DL (ref 8.7–10.4)
CHLORIDE SERPL-SCNC: 107 MMOL/L (ref 98–112)
CO2 SERPL-SCNC: 20 MMOL/L (ref 21–32)
CREAT BLD-MCNC: 0.53 MG/DL
CREAT UR-SCNC: 52.7 MG/DL
DEPRECATED RDW RBC AUTO: 44.2 FL (ref 35.1–46.3)
EGFRCR SERPLBLD CKD-EPI 2021: 128 ML/MIN/1.73M2 (ref 60–?)
EOSINOPHIL # BLD AUTO: 0.09 X10(3) UL (ref 0–0.7)
EOSINOPHIL NFR BLD AUTO: 0.7 %
ERYTHROCYTE [DISTWIDTH] IN BLOOD BY AUTOMATED COUNT: 16 % (ref 11–15)
GLOBULIN PLAS-MCNC: 3.4 G/DL (ref 2.8–4.4)
GLUCOSE BLD-MCNC: 100 MG/DL (ref 70–99)
GLUCOSE BLDC GLUCOMTR-MCNC: 155 MG/DL (ref 70–99)
GLUCOSE BLDC GLUCOMTR-MCNC: 75 MG/DL (ref 70–99)
GLUCOSE BLDC GLUCOMTR-MCNC: 82 MG/DL (ref 70–99)
GLUCOSE BLDC GLUCOMTR-MCNC: 84 MG/DL (ref 70–99)
GLUCOSE BLDC GLUCOMTR-MCNC: 85 MG/DL (ref 70–99)
GLUCOSE BLDC GLUCOMTR-MCNC: 92 MG/DL (ref 70–99)
HCT VFR BLD AUTO: 33 %
HGB BLD-MCNC: 10.1 G/DL
IMM GRANULOCYTES # BLD AUTO: 0.14 X10(3) UL (ref 0–1)
IMM GRANULOCYTES NFR BLD: 1.1 %
LYMPHOCYTES # BLD AUTO: 2.51 X10(3) UL (ref 1–4)
LYMPHOCYTES NFR BLD AUTO: 19.2 %
MCH RBC QN AUTO: 23.7 PG (ref 26–34)
MCHC RBC AUTO-ENTMCNC: 30.6 G/DL (ref 31–37)
MCV RBC AUTO: 77.5 FL
MONOCYTES # BLD AUTO: 1.07 X10(3) UL (ref 0.1–1)
MONOCYTES NFR BLD AUTO: 8.2 %
NEUTROPHILS # BLD AUTO: 9.21 X10 (3) UL (ref 1.5–7.7)
NEUTROPHILS # BLD AUTO: 9.21 X10(3) UL (ref 1.5–7.7)
NEUTROPHILS NFR BLD AUTO: 70.5 %
OSMOLALITY SERPL CALC.SUM OF ELEC: 280 MOSM/KG (ref 275–295)
PLATELET # BLD AUTO: 282 10(3)UL (ref 150–450)
POTASSIUM SERPL-SCNC: 4 MMOL/L (ref 3.5–5.1)
PROT SERPL-MCNC: 7.1 G/DL (ref 5.7–8.2)
PROT UR-MCNC: 6.4 MG/DL (ref ?–14)
PROT/CREAT UR-RTO: 0.12
RBC # BLD AUTO: 4.26 X10(6)UL
RH BLOOD TYPE: POSITIVE
SODIUM SERPL-SCNC: 136 MMOL/L (ref 136–145)
WBC # BLD AUTO: 13.1 X10(3) UL (ref 4–11)

## 2024-02-29 PROCEDURE — 3E033VJ INTRODUCTION OF OTHER HORMONE INTO PERIPHERAL VEIN, PERCUTANEOUS APPROACH: ICD-10-PCS | Performed by: OBSTETRICS & GYNECOLOGY

## 2024-02-29 PROCEDURE — 10907ZC DRAINAGE OF AMNIOTIC FLUID, THERAPEUTIC FROM PRODUCTS OF CONCEPTION, VIA NATURAL OR ARTIFICIAL OPENING: ICD-10-PCS | Performed by: OBSTETRICS & GYNECOLOGY

## 2024-02-29 PROCEDURE — 0UQMXZZ REPAIR VULVA, EXTERNAL APPROACH: ICD-10-PCS | Performed by: OBSTETRICS & GYNECOLOGY

## 2024-02-29 PROCEDURE — 59409 OBSTETRICAL CARE: CPT | Performed by: OBSTETRICS & GYNECOLOGY

## 2024-02-29 PROCEDURE — 3E0P7VZ INTRODUCTION OF HORMONE INTO FEMALE REPRODUCTIVE, VIA NATURAL OR ARTIFICIAL OPENING: ICD-10-PCS | Performed by: OBSTETRICS & GYNECOLOGY

## 2024-02-29 RX ORDER — ACETAMINOPHEN 500 MG
500 TABLET ORAL EVERY 6 HOURS PRN
Status: DISCONTINUED | OUTPATIENT
Start: 2024-02-29 | End: 2024-03-02

## 2024-02-29 RX ORDER — TERBUTALINE SULFATE 1 MG/ML
0.25 INJECTION, SOLUTION SUBCUTANEOUS AS NEEDED
Status: DISCONTINUED | OUTPATIENT
Start: 2024-02-29 | End: 2024-03-01 | Stop reason: HOSPADM

## 2024-02-29 RX ORDER — NALBUPHINE HYDROCHLORIDE 10 MG/ML
2.5 INJECTION, SOLUTION INTRAMUSCULAR; INTRAVENOUS; SUBCUTANEOUS
Status: DISCONTINUED | OUTPATIENT
Start: 2024-02-29 | End: 2024-03-02

## 2024-02-29 RX ORDER — SIMETHICONE 80 MG
80 TABLET,CHEWABLE ORAL 3 TIMES DAILY PRN
Status: DISCONTINUED | OUTPATIENT
Start: 2024-02-29 | End: 2024-03-02

## 2024-02-29 RX ORDER — SODIUM CHLORIDE 9 MG/ML
25 INJECTION, SOLUTION INTRAVENOUS CONTINUOUS PRN
Status: DISCONTINUED | OUTPATIENT
Start: 2024-02-29 | End: 2024-03-01 | Stop reason: HOSPADM

## 2024-02-29 RX ORDER — ACETAMINOPHEN 500 MG
1000 TABLET ORAL EVERY 6 HOURS PRN
Status: DISCONTINUED | OUTPATIENT
Start: 2024-02-29 | End: 2024-03-02

## 2024-02-29 RX ORDER — LIDOCAINE HYDROCHLORIDE 10 MG/ML
30 INJECTION, SOLUTION EPIDURAL; INFILTRATION; INTRACAUDAL; PERINEURAL ONCE
Status: DISCONTINUED | OUTPATIENT
Start: 2024-02-29 | End: 2024-03-01 | Stop reason: HOSPADM

## 2024-02-29 RX ORDER — NICOTINE POLACRILEX 4 MG
30 LOZENGE BUCCAL
Status: DISCONTINUED | OUTPATIENT
Start: 2024-02-29 | End: 2024-03-01 | Stop reason: HOSPADM

## 2024-02-29 RX ORDER — IBUPROFEN 600 MG/1
600 TABLET ORAL EVERY 6 HOURS
Status: DISCONTINUED | OUTPATIENT
Start: 2024-03-01 | End: 2024-03-02

## 2024-02-29 RX ORDER — BUPIVACAINE HYDROCHLORIDE 2.5 MG/ML
20 INJECTION, SOLUTION EPIDURAL; INFILTRATION; INTRACAUDAL ONCE
Status: COMPLETED | OUTPATIENT
Start: 2024-02-29 | End: 2024-02-29

## 2024-02-29 RX ORDER — ONDANSETRON 2 MG/ML
4 INJECTION INTRAMUSCULAR; INTRAVENOUS EVERY 6 HOURS PRN
Status: DISCONTINUED | OUTPATIENT
Start: 2024-02-29 | End: 2024-03-02

## 2024-02-29 RX ORDER — DEXTROSE MONOHYDRATE 25 G/50ML
50 INJECTION, SOLUTION INTRAVENOUS
Status: DISCONTINUED | OUTPATIENT
Start: 2024-02-29 | End: 2024-03-01 | Stop reason: HOSPADM

## 2024-02-29 RX ORDER — ACETAMINOPHEN 500 MG
500 TABLET ORAL EVERY 6 HOURS PRN
Status: DISCONTINUED | OUTPATIENT
Start: 2024-02-29 | End: 2024-03-01 | Stop reason: HOSPADM

## 2024-02-29 RX ORDER — SODIUM CHLORIDE, SODIUM LACTATE, POTASSIUM CHLORIDE, CALCIUM CHLORIDE 600; 310; 30; 20 MG/100ML; MG/100ML; MG/100ML; MG/100ML
INJECTION, SOLUTION INTRAVENOUS CONTINUOUS PRN
Status: DISCONTINUED | OUTPATIENT
Start: 2024-02-29 | End: 2024-03-01 | Stop reason: HOSPADM

## 2024-02-29 RX ORDER — LIDOCAINE HYDROCHLORIDE 10 MG/ML
INJECTION, SOLUTION EPIDURAL; INFILTRATION; INTRACAUDAL; PERINEURAL AS NEEDED
Status: DISCONTINUED | OUTPATIENT
Start: 2024-02-29 | End: 2024-02-29 | Stop reason: SURG

## 2024-02-29 RX ORDER — LIDOCAINE HYDROCHLORIDE AND EPINEPHRINE 15; 5 MG/ML; UG/ML
INJECTION, SOLUTION EPIDURAL AS NEEDED
Status: DISCONTINUED | OUTPATIENT
Start: 2024-02-29 | End: 2024-02-29 | Stop reason: SURG

## 2024-02-29 RX ORDER — DEXTROSE, SODIUM CHLORIDE, SODIUM LACTATE, POTASSIUM CHLORIDE, AND CALCIUM CHLORIDE 5; .6; .31; .03; .02 G/100ML; G/100ML; G/100ML; G/100ML; G/100ML
50 INJECTION, SOLUTION INTRAVENOUS CONTINUOUS PRN
Status: DISCONTINUED | OUTPATIENT
Start: 2024-02-29 | End: 2024-03-01 | Stop reason: HOSPADM

## 2024-02-29 RX ORDER — BENZOCAINE/MENTHOL 6 MG-10 MG
1 LOZENGE MUCOUS MEMBRANE EVERY 6 HOURS PRN
Status: DISCONTINUED | OUTPATIENT
Start: 2024-02-29 | End: 2024-03-02

## 2024-02-29 RX ORDER — ACETAMINOPHEN 500 MG
1000 TABLET ORAL EVERY 6 HOURS PRN
Status: DISCONTINUED | OUTPATIENT
Start: 2024-02-29 | End: 2024-03-01 | Stop reason: HOSPADM

## 2024-02-29 RX ORDER — ONDANSETRON 2 MG/ML
4 INJECTION INTRAMUSCULAR; INTRAVENOUS EVERY 6 HOURS PRN
Status: DISCONTINUED | OUTPATIENT
Start: 2024-02-29 | End: 2024-03-01 | Stop reason: HOSPADM

## 2024-02-29 RX ORDER — SODIUM CHLORIDE, SODIUM LACTATE, POTASSIUM CHLORIDE, CALCIUM CHLORIDE 600; 310; 30; 20 MG/100ML; MG/100ML; MG/100ML; MG/100ML
INJECTION, SOLUTION INTRAVENOUS CONTINUOUS
Status: DISCONTINUED | OUTPATIENT
Start: 2024-02-29 | End: 2024-03-01 | Stop reason: HOSPADM

## 2024-02-29 RX ORDER — NALBUPHINE HYDROCHLORIDE 10 MG/ML
10 INJECTION, SOLUTION INTRAMUSCULAR; INTRAVENOUS; SUBCUTANEOUS EVERY 6 HOURS PRN
Status: DISCONTINUED | OUTPATIENT
Start: 2024-02-29 | End: 2024-03-01 | Stop reason: HOSPADM

## 2024-02-29 RX ORDER — DEXTROSE, SODIUM CHLORIDE, SODIUM LACTATE, POTASSIUM CHLORIDE, AND CALCIUM CHLORIDE 5; .6; .31; .03; .02 G/100ML; G/100ML; G/100ML; G/100ML; G/100ML
INJECTION, SOLUTION INTRAVENOUS AS NEEDED
Status: DISCONTINUED | OUTPATIENT
Start: 2024-02-29 | End: 2024-03-01 | Stop reason: HOSPADM

## 2024-02-29 RX ORDER — BUPIVACAINE HCL/0.9 % NACL/PF 0.25 %
5 PLASTIC BAG, INJECTION (ML) EPIDURAL AS NEEDED
Status: DISCONTINUED | OUTPATIENT
Start: 2024-02-29 | End: 2024-03-02

## 2024-02-29 RX ORDER — IBUPROFEN 600 MG/1
600 TABLET ORAL ONCE AS NEEDED
Status: DISCONTINUED | OUTPATIENT
Start: 2024-02-29 | End: 2024-03-01 | Stop reason: HOSPADM

## 2024-02-29 RX ORDER — BISACODYL 10 MG
10 SUPPOSITORY, RECTAL RECTAL ONCE AS NEEDED
Status: DISCONTINUED | OUTPATIENT
Start: 2024-02-29 | End: 2024-03-02

## 2024-02-29 RX ORDER — CITRIC ACID/SODIUM CITRATE 334-500MG
30 SOLUTION, ORAL ORAL AS NEEDED
Status: DISCONTINUED | OUTPATIENT
Start: 2024-02-29 | End: 2024-03-01 | Stop reason: HOSPADM

## 2024-02-29 RX ORDER — AMMONIA INHALANTS 0.04 G/.3ML
0.3 INHALANT RESPIRATORY (INHALATION) AS NEEDED
Status: DISCONTINUED | OUTPATIENT
Start: 2024-02-29 | End: 2024-03-02

## 2024-02-29 RX ORDER — HYDROXYZINE HYDROCHLORIDE 50 MG/ML
50 INJECTION, SOLUTION INTRAMUSCULAR EVERY 6 HOURS PRN
Status: DISCONTINUED | OUTPATIENT
Start: 2024-02-29 | End: 2024-03-01 | Stop reason: HOSPADM

## 2024-02-29 RX ORDER — DOCUSATE SODIUM 100 MG/1
100 CAPSULE, LIQUID FILLED ORAL
Status: DISCONTINUED | OUTPATIENT
Start: 2024-03-01 | End: 2024-03-02

## 2024-02-29 RX ORDER — NICOTINE POLACRILEX 4 MG
15 LOZENGE BUCCAL
Status: DISCONTINUED | OUTPATIENT
Start: 2024-02-29 | End: 2024-03-01 | Stop reason: HOSPADM

## 2024-02-29 RX ADMIN — LIDOCAINE HYDROCHLORIDE AND EPINEPHRINE 5 ML: 15; 5 INJECTION, SOLUTION EPIDURAL at 20:08:00

## 2024-02-29 RX ADMIN — BUPIVACAINE HYDROCHLORIDE 10 ML: 2.5 INJECTION, SOLUTION EPIDURAL; INFILTRATION; INTRACAUDAL at 20:09:00

## 2024-02-29 RX ADMIN — LIDOCAINE HYDROCHLORIDE 3 ML: 10 INJECTION, SOLUTION EPIDURAL; INFILTRATION; INTRACAUDAL; PERINEURAL at 20:05:00

## 2024-02-29 NOTE — CONSULTS
[facility]  Maternal-Fetal Medicine Inpatient Consultation    Date of Admission:  2024  Date of Consult:  2024    Reason for Consult:   Glucose management of GDM A2 in labor    History of Present Illness:  Mónica Earl is a a(n) 28 year old female.  with an IUP at Gestational Age: 38w0d    Who  presents for induction of labor due to GDM A2.  She is currently not feeling contractions.  She is on Cytotec.  We reviewed that she was on Levemir insulin 14 units in the morning and 76 units nightly as well as NovoLog 32 units with dinner.    We reviewed that gestational diabetes generally resolves very quickly after delivery.  In light of her moderately high insulin needs, it is possible she will continue to have diabetes after delivery.      Review of History:      OB History:    OB History    Para Term  AB Living   3 2 2 0 0 2   SAB IAB Ectopic Multiple Live Births   0 0 0 0 2      # Outcome Date GA Lbr Srinivasa/2nd Weight Sex Delivery Anes PTL Lv   3 Current            2 Term 16 38w1d  7 lb 5 oz (3.317 kg) F NORMAL SPONT None  JENSEN      Apgar1: 8  Apgar5: 9   1 Term 08/10/14 38w0d  5 lb 15 oz (2.693 kg) F NORMAL SPONT None N JENSEN       Other Relevant History:  Past Medical History:   Diagnosis Date    Anemia     Essential hypertension     Gestational diabetes (HCC)     Pre-diabetes     Early A1C 5.7. 3hr GTT ordered     History reviewed. No pertinent surgical history.  Family History   Problem Relation Age of Onset    Thyroid disease Mother     Diabetes Maternal Grandmother     Diabetes Paternal Grandmother     Breast Cancer Paternal Grandmother 62        62    Hypertension Other         Family h/o Hypertension    Thyroid disease Other         Family h/o Thyroid Disease    Other (Other) Other         No Family h    Asthma Other         Family h/o Asthma    Diabetes Other         Family h/o Diabetes    Heart Disease Other         Family h/o Coronary Artery Disease     Glaucoma Neg       reports that she has never smoked. She has never used smokeless tobacco. She reports that she does not drink alcohol and does not use drugs.    Allergies:  No Known Allergies    Medications:    Current Facility-Administered Medications:     acetaminophen (Tylenol Extra Strength) tab 500 mg, 500 mg, Oral, Q6H PRN    acetaminophen (Tylenol Extra Strength) tab 1,000 mg, 1,000 mg, Oral, Q6H PRN    ibuprofen (Motrin) tab 600 mg, 600 mg, Oral, Once PRN    ondansetron (Zofran) 4 MG/2ML injection 4 mg, 4 mg, Intravenous, Q6H PRN    oxyTOCIN in sodium chloride 0.9% (Pitocin) 30 Units/500mL infusion premix, 62.5-900 lex-units/min, Intravenous, Continuous    terbutaline (Brethine) 1 MG/ML injection 0.25 mg, 0.25 mg, Subcutaneous, PRN    sodium citrate-citric acid (Bicitra) 500-334 MG/5ML oral solution 30 mL, 30 mL, Oral, PRN    lidocaine PF (Xylocaine-MPF) 1% injection, 30 mL, Intradermal, Once    lactated ringers infusion, , Intravenous, Continuous    dextrose in lactated ringers 5% infusion, , Intravenous, PRN    lactated ringers IV bolus 500 mL, 500 mL, Intravenous, PRN    nalbuphine (Nubain) 10 mg/mL injection 10 mg, 10 mg, Intramuscular, Q6H PRN **AND** hydrOXYzine 50 mg/mL injection 50 mg, 50 mg, Intramuscular, Q6H PRN    fentaNYL (Sublimaze) 50 mcg/mL injection 100 mcg, 100 mcg, Intravenous, Once PRN    fentaNYL (Sublimaze) 50 mcg/mL injection 50 mcg, 50 mcg, Intravenous, Q30 Min PRN    [COMPLETED] ampicillin (Omnipen) 2 g in sodium chloride 0.9% 100 mL IVPB-MBP, 2 g, Intravenous, Once **FOLLOWED BY** ampicillin (Omnipen) 1 g in sodium chloride 0.9% 100 mL IVPB-MBP, 1 g, Intravenous, Q4H    misoprostol (CYTOTEC) partial tablets 25 mcg, 25 mcg, Vaginal, Q3H    Physical examination:  General: Alert, orientated x3.  Cooperative.  No apparent distress.  Vital Signs: /79   Pulse 91   Temp 98.1 °F (36.7 °C) (Oral)   Resp 16   Ht 5' 7\" (1.702 m)   Wt 238 lb (108 kg)   LMP 06/08/2023 (Exact  Date)   BMI 37.28 kg/m²     HEENT: Exam is unremarkable.    We reviewed that her 2-hour postprandial blood sugar after breakfast was 84.        Laboratory Data:  Lab Results   Component Value Date    WBC 13.1 2024    HGB 10.1 2024    HCT 33.0 2024    .0 2024    CREATSERUM 0.53 2024    BUN 6 2024     2024    K 4.0 2024     2024    CO2 20.0 2024     2024    CA 9.1 2024    ALB 3.7 2024    ALKPHO 135 2024    BILT 0.3 2024    TP 7.1 2024    AST 15 2024    ALT 11 2024       NARRATIVE:   A2 GESTATIONAL DIABETES MANAGEMENT   Intrapartum  During labor, periodic assessment of maternal glucose levels and treatment of hyperglycemia is prudent, although intrapartum maternal hyperglycemia leading to an adverse  outcome is infrequent in GDM.  The goal of treatment is to reduce the risk of  hypoglycemia. Although prolonged  hypoglycemia is primarily due to fetal exposure to chronic hyperglycemia during pregnancy and resultant fetal pancreatic hyperplasia, transient hypoglycemia can be caused by intrapartum maternal hyperglycemia, which induces an acute rise in fetal insulin,  Insulin requirements usually decrease during labor, as the work of labor, particularly uterine contractions, requires energy and oral caloric intake is typically reduced. Women with GDM who were euglycemic without use of insulin or oral antihyperglycemic drugs during pregnancy do not normally require insulin during labor and delivery, and thus do not need their blood glucose levels checked hourly.  Women with GDM who used insulin or oral antihyperglycemic drugs to maintain euglycemia occasionally need insulin during labor and delivery to maintain euglycemia. There is no consensus about optimal glycemic control during labor and delivery.  The Endocrine Society suggests target glucose levels of 72 to 126  mg/dL. Generally blood glucose measurements can be assessed every two hours during labor and begin intravenous insulin at glucose levels above 120 mg/dL.  For women undergoing scheduled  delivery, insulin or antihyperglycemic drugs are withheld the morning of surgery and the woman is not allowed any oral intake.    Post-partum Management & Follow-Up  Women with gestational diabetes mellitus (GDM) should be able to resume a normal diet postpartum. After delivery, the hyperglycemic effects of placental hormones dissipate rapidly. Thus, most women revert back to their prepregnancy glycemic status almost immediately.  However, since some women with GDM may have previously unrecognized type 2 diabetes mellitus, I advise following The Endocrine Society recommendations to check glucose concentrations for 24 to 72 hours after delivery to exclude ongoing hyperglycemia, If fasting glucose concentrations suggest overt diabetes (fasting glucose ?126 mg/dL or random glucose ?200 mg/dL), treatment is warranted; the type of treatment (weight reduction, diet, exercise, medication) should be decided on a case-by-case basis, with consultation from an endocrinologist. Women who have fasting glucose levels below 126 mg/dL after delivery should have a two-hour 75-gram oral glucose tolerance test 6 to 12 weeks postpartum to test for diabetes or prediabetes. Women with diabetes are managed, as medically appropriate.    Resume normal diet  Assess daily fasting blood glucose 24 - 72 hours after delivery  If below 126 mg/dl - no insulin or hypoglycemic agent warranted, 2-hour 75 gram glucose tolerance test 6-12 weeks PP  If greater than or equal to 126 mg/dl the patient likely has overt diabetes, endocrinology consultation to discuss optimal management strategy (weight reduction, diet, exercise, medication)      IMPRESSION:    IUP at 38w0d admitted for induction of labor  GDM A2     RECOMMENDATIONS:   Initiate insulin via IV drip if/  when her BS meets criteria for initiation of insulin  Monitor blood sugars every 4 hours while monitored in labor.  Increase the frequency of Accu-Cheks to every 2 hour in latent labor and hourly in active labor  Postpartum she does not need immediate Accu-Cheks but should have daily fasting blood sugars  Patient was reminded that she will need to have a glucose tolerance test 6 to 12 weeks after delivery    Thank you for allowing me to participate in the care of your patient.  Please do not hesitate to contact me if additional questions or concerns arise.  The majority of the time (>50%) was spent in review of records, consultation and coordination of care.  Our discussion is summarized above. The approximate physician face-to-face time was 35 minutes.      Shakira Platt MD  2/29/2024  2:22 PM

## 2024-02-29 NOTE — PROGRESS NOTES
Pt is a 28 year old female admitted to LDR3/LDR3-A.     Chief Complaint   Patient presents with    Scheduled Induction     CHTN, GDM       Pt is  38w0d intra-uterine pregnancy.  History obtained, consents signed. Oriented to room, staff, and plan of care.

## 2024-02-29 NOTE — H&P
[facility]  Obstetrics and Gynecology  History & Physical    Mónica Earl Patient Status:  Inpatient    1995 MRN [id number]   Location [facility] Attending Eloina Hussein DO   Hospital Day 0 PCP Nanci Dsouza MD     CC: Patient is here for induction of labor    SUBJECTIVE:    Mónica Earl is a 28 year old  female at 38w0d Estimated Date of Delivery: 3/14/24 who is being admitted for induction of labor. Her current obstetrical history is significant for GDMA2 and cHTN. Patient reports no complaints.     negative UCx.    negative VB.   negative LOF.    positive Fetal movement.   negative Nausea, Vomiting, headache, vision changes and RUQ/Epigastric pain.       DILMA Confirmation  LMP: Patient's last menstrual period was 2023 (exact date).  DILMA: 3/14/2024, by Last Menstrual Period     OB Ultrasounds:   1) OB US 24 MFM: vertex, 6#13oz    BSUS in office yesterday - confirmed cephalic        Obstetric History:   OB History    Para Term  AB Living   3 2 2     2   SAB IAB Ectopic Multiple Live Births           2      # Outcome Date GA Lbr Srinivasa/2nd Weight Sex Delivery Anes PTL Lv   3 Current            2 Term 16 38w1d  7 lb 5 oz (3.317 kg) F NORMAL SPONT None  JENSEN   1 Term 08/10/14 38w0d  5 lb 15 oz (2.693 kg) F NORMAL SPONT None N JENSEN     Past Medical History:   Past Medical History:   Diagnosis Date    Anemia     Essential hypertension     Gestational diabetes (HCC)     Pre-diabetes     Early A1C 5.7. 3hr GTT ordered     Past Social History: History reviewed. No pertinent surgical history.  Family History:   Family History   Problem Relation Age of Onset    Thyroid disease Mother     Diabetes Maternal Grandmother     Diabetes Paternal Grandmother     Breast Cancer Paternal Grandmother 62        62    Hypertension Other         Family h/o Hypertension    Thyroid disease Other         Family h/o Thyroid Disease     Other (Other) Other         No Family h    Asthma Other         Family h/o Asthma    Diabetes Other         Family h/o Diabetes    Heart Disease Other         Family h/o Coronary Artery Disease    Glaucoma Neg      Social History:   Social History     Tobacco Use    Smoking status: Never    Smokeless tobacco: Never   Substance Use Topics    Alcohol use: No     Alcohol/week: 0.0 standard drinks of alcohol       Home Meds:   Medications Prior to Admission   Medication Sig Dispense Refill Last Dose    Insulin Lispro, 1 Unit Dial, (HUMALOG KWIKPEN) 100 UNIT/ML Subcutaneous Solution Pen-injector Inject 30 Units into the skin daily with dinner. 3 mL 5 2/28/2024    betamethasone 0.1 % External Cream Apply 1 Application topically 2 (two) times daily. 15 g 0 2/28/2024    aspirin (ASPIRIN LOW DOSE) 81 MG Oral Chew Tab Chew 1.5 tablets (121.5 mg total) by mouth daily. 60 tablet 2 2/28/2024    insulin detemir (LEVEMIR FLEXPEN) 100 UNIT/ML Subcutaneous Solution Pen-injector Inject 14 Units into the skin every morning. Inject 74 units every night (divided in to two doses of 37 units each given at least two inches apart) 15 mL 1 2/29/2024    Multiple Vitamins-Minerals (MULTIVITAL OR) Take by mouth.   2/29/2024    Insulin Pen Needle 32G X 4 MM Does not apply Misc Inject 4 Needles into the skin in the morning, at noon, in the evening, and at bedtime. Use with flex pen as direct 200 each 2     Glucose Blood (ONETOUCH VERIO) In Vitro Strip Check blood sugar 4x daily. 300 strip 3     Lancets Does not apply Misc Check blood sugar 4x daily. 200 each 5     Blood Glucose Monitoring Suppl (ONETOUCH VERIO FLEX SYSTEM) w/Device Does not apply Kit 1 kit 4 (four) times daily. 1 kit 0     Glucose Blood (ONETOUCH VERIO) In Vitro Strip 1 strip 4 (four) times daily. Use daily as directed. 100 strip 2     OneTouch Delica Lancets 33G Does not apply Misc 1 Lancet by Finger stick route daily. May substitute for covered brand only if prescribed brand os  not covered. 100 each 2     Blood Pressure Monitoring (BLOOD PRESSURE CUFF) Does not apply Misc Take blood pressure once daily and record reading 1 each 0      Allergies: No Known Allergies    OBJECTIVE:    Pulse:  [105] 105  Resp:  [16] 16  BP: (139)/(84) 139/84  Body mass index is 37.28 kg/m².    General: AAO. NAD.   Lungs: Respirations non labored   CV: peripheral pulses +2 bilaterally   Abdomen: soft, nontender, nondistended, no abnormal masses, no epigastric pain and FHT present, gravid   Extremities: negative edema bilaterally, negative calf tenderness bilaterally    FHT: moderate variability/130 BPM / Positive accelerations/Negative decelerations   TOCO: rare    SVE:  yesterday 1 cm.    Leopolds: cephalic    Prenatal Labs Brief Review   Blood Type:   Lab Results   Component Value Date    ABO AB 2024    RH Positive 2024     GBS:  Positive      Inpatient labs:  Lab Results   Component Value Date    WBC 13.1 2024    HGB 10.1 2024    HCT 33.0 2024    .0 2024    CREATSERUM 0.53 2024    BUN 6 2024     2024    K 4.0 2024     2024    CO2 20.0 2024     2024    CA 9.1 2024    ALB 3.7 2024    ALKPHO 135 2024    BILT 0.3 2024    TP 7.1 2024    AST 15 2024    ALT 11 2024       ASSESSMENT/ PLAN:    Mónica Earl is a 28 year old  female at 38w0d Estimated Date of Delivery: 3/14/24 who is being admitted for induction of labor.    Patient Active Problem List   Diagnosis    Chronic hypertension affecting pregnancy (HCC)    GDM, likely pregestational    Obesity affecting pregnancy in third trimester (HCC)    At risk for aneuploidy - high risk ffDNA    Breech presentation (HCC)    Unstable fetal lie (HCC)    Pregnancy ([facility])       1. Labor:   - admit with routine labs  - cytotec vaginal q 3 hours  2. Fetal monitoring: CEFM category 1  3. GBS: ampicillin per protocol to start  now  4. Pain: medication when requested  5. cHTN   - baseline labs on admission  - monitor BP per unit protocol  6. GDMA2  - MFM on consult to manage glucose.    Risks, benefits, alternatives and possible complications have been discussed in detail with the patient.  Pre-admission, admission, and post admission procedures and expectations were discussed in detail.  All questions answered, all appropriate consents signed at the Hospital. Admission is planned for today.     DO MARIA TERESA Best

## 2024-02-29 NOTE — PROGRESS NOTES
Labor progress note    Patient feeling some cramps.    Vitals:  Temp:  [97.9 °F (36.6 °C)-98.6 °F (37 °C)] 98.6 °F (37 °C)  Pulse:  [] 83  Resp:  [16-18] 18  BP: (139-153)/(79-85) 141/85    FHT: baseline 130s, moderate variability, + accelerations, no decelerations  [place]: irregular q 2-5 min  Cervix: 2/40/-3, AROM with moderate amount clear fluid    A/P:  29 yo  @ 38w0d, IOL  IOL -   - amniotomy just now, with ctx q 2 after, will start pitocin protocol 2 if they space to q 4 min  - category 1 FHT    cHTN  - BP as noted above  - cont to monitor    GDMA2  - glucose managed by MFM, accuchecks 84, 75.    Eloina Hussein, DO

## 2024-02-29 NOTE — PLAN OF CARE
Problem: BIRTH - VAGINAL/ SECTION  Goal: Fetal and maternal status remain reassuring during the birth process  Description: INTERVENTIONS:  - Monitor vital signs  - Monitor fetal heart rate  - Monitor uterine activity  - Monitor labor progression (vaginal delivery)  - DVT prophylaxis (C/S delivery)  - Surgical antibiotic prophylaxis (C/S delivery)  Outcome: Progressing     Problem: PAIN - ADULT  Goal: Verbalizes/displays adequate comfort level or patient's stated pain goal  Description: INTERVENTIONS:  - Encourage pt to monitor pain and request assistance  - Assess pain using appropriate pain scale  - Administer analgesics based on type and severity of pain and evaluate response  - Implement non-pharmacological measures as appropriate and evaluate response  - Consider cultural and social influences on pain and pain management  - Manage/alleviate anxiety  - Utilize distraction and/or relaxation techniques  - Monitor for opioid side effects  - Notify MD/LIP if interventions unsuccessful or patient reports new pain  - Anticipate increased pain with activity and pre-medicate as appropriate  Outcome: Progressing     Problem: ANXIETY  Goal: Will report anxiety at manageable levels  Description: INTERVENTIONS:  - Administer medication as ordered  - Teach and rehearse alternative coping skills  - Provide emotional support with 1:1 interaction with staff  Outcome: Progressing     Problem: Patient Centered Care  Goal: Patient preferences are identified and integrated in the patient's plan of care  Description: Interventions:  - What would you like us to know as we care for you? We are having our first boy  - Provide timely, complete, and accurate information to patient/family  - Incorporate patient and family knowledge, values, beliefs, and cultural backgrounds into the planning and delivery of care  - Encourage patient/family to participate in care and decision-making at the level they choose  - Stacy patient and  family perspectives and choices  Outcome: Progressing     Problem: Patient/Family Goals  Goal: Patient/Family Long Term Goal  Description: Patient's Long Term Goal: Uncomplicated Delivery     Interventions:  - Assessment/Monitoring  - Induction/Augmentation per protocol and Provider order  - C/S per protocol and Provider order   - Education  - Intervention per protocol and Provider order with education   - Involve patient in POC  - See additional Care Plan goals for specific interventions  Outcome: Progressing  Goal: Patient/Family Short Term Goal  Description: Patient's Short Term Goal: Comfort and Pain Control     Interventions:   - Non Pharmacological pain intervention   - IV/IM and Epidural pain medication per Provider order and patient request  - Education  - Involve Patient in POC   - See additional Care Plan goals for specific interventions  Outcome: Progressing

## 2024-03-01 LAB
GLUCOSE BLDC GLUCOMTR-MCNC: 122 MG/DL (ref 70–99)
GLUCOSE BLDC GLUCOMTR-MCNC: 88 MG/DL (ref 70–99)
GLUCOSE BLDC GLUCOMTR-MCNC: 91 MG/DL (ref 70–99)
GLUCOSE BLDC GLUCOMTR-MCNC: 98 MG/DL (ref 70–99)

## 2024-03-01 NOTE — L&D DELIVERY NOTE
Dain Earl [[id number]]      Labor Events     labor?: No   steroids?: None  Antibiotics received during labor?: Yes  Antibiotics (enter # doses in comment): ampicillin  Rupture date/time: 2024 1625     Rupture type: AROM  Fluid color: Clear  Labor type: Induced Onset of Labor  Induction: Cervidil, Oxytocin  Indications for induction: Chronic Hypertension, IDDM/GDDM  Intrapartum & labor complications: Late decelerations       Labor Event Times    Labor onset date/time: 2024 1310  Dilation complete date/time: 2024        Presentation    Presentation: Vertex  Position: Occiput Anterior       Operative Delivery    Operative Vaginal Delivery: No                      Shoulder Dystocia    Shoulder Dystocia: No             Anesthesia    Method: Epidural               Delivery      Head delivery date/time: 2024 23:34:58   Delivery date/time:  24 23:35:08   Delivery type: Normal spontaneous vaginal delivery    Details:     Delivery location: delivery room  Delivery Room Temperature: 73       Delivery Providers    Delivering Clinician: Eloina Hussein DO   Delivery personnel:  Provider Role   Susan Bal, RN Baby Nurse   Marysol Ramírez RN Delivery Nurse             Cord    Vessels: 3 Vessels  Complications: None  Timed cord clamping: Yes  Time in sec: 60  Cord blood disposition: to lab       Resuscitation    Method: None        Measurements    No data filed       Placenta    Date/time: 2024  Removal: Spontaneous  Appearance: Intact  Disposition: Discarded       Apgars    No data filed       Skin to Skin    Skin to skin initiated date/time: 2024  Skin to skin with: Mother       Vaginal Count    No data filed       Delivery (Maternal)    Episiotomy: None  Periurethral laceration: bilateral Repaired?: Yes               [facility]   Vaginal Delivery Procedure Note      Mónica Earl Patient Status:  Inpatient     1995 MRN [id number]   Location [facility] Attending Eloina Hussein DO   Hosp Day # 0 PCP Nanci Dsouza MD     Date of Delivery: 24    Pre procedure diagnosis:  IUP at Term, IOL for cHTN, GDMA2    Post procedure diagnosis: Same, delivered    Procedure: Normal Spontaneous Vaginal Delivery    Attending: Eloina Hussein DO      Anesthesia: Epidural    EBL:  350 mL, QBL pending    Indications:  Patient is a 28 year old  at 38w0d who presented for induction of labor, she progressed to complete cervical dilation after cytotec, amniotomy and pitocin.       Findings:    Sex: male     Weight:pending recording      Apgars: 9/9      Lacerations: no perineal laceration, + midline periurethral, no cervical       Procedure:  The patient was confirmed to be completely dilated. The patients was placed in the dorsolithotomy position.  She was then encouraged to push.  As the head was delivered in direct OA position, the perineum was supported to decrease the risk of tearing. The shoulders rotated spontaneously and delivery was completed without complication.  The cord was doubly clamped then cut after 60 seconds.  The baby was placed on the mother's abdomen.  IV pitocin bolus was initiated. The cord blood was sampled. The placenta then delivered intact. The uterus was noted to be firm. Good hemostasis was noted. The perineum, vaginal mucosa and cervix was then examined.    A midline starla-urethral laceration was repaired with 3-0 vicryl. Urethra was identified and easily catheterized after this repair.  Bleeding was minimal.  The patient was then moved to the supine position in stable condition.  Sponge, needle, and instrument counts were correct.    Complications:  None     Mother and infant in good condition in the delivery room.    DO MARIA TERESA Best

## 2024-03-01 NOTE — ANESTHESIA PREPROCEDURE EVALUATION
Anesthesia PreOp Note    HPI:     Mónica Earl is a 28 year old female who presents for preoperative consultation requested by: * No surgeons listed *    Date of Surgery: 2/29/2024    * No procedures listed *  Indication: * No pre-op diagnosis entered *    Relevant Problems   No relevant active problems       NPO:                         History Review:  Patient Active Problem List    Diagnosis Date Noted    Pregnancy ([facility]) 02/29/2024    Unstable fetal lie ([facility]) 02/21/2024    Breech presentation ([facility]) 01/23/2024    At risk for aneuploidy - high risk ffDNA 10/28/2023    Obesity affecting pregnancy in third trimester ([facility]) 10/03/2023    GDM, likely pregestational 09/15/2023    Chronic hypertension affecting pregnancy ([facility]) 09/12/2023       Past Medical History:   Diagnosis Date    Anemia     Essential hypertension     Gestational diabetes ([facility])     Pre-diabetes     Early A1C 5.7. 3hr GTT ordered       History reviewed. No pertinent surgical history.    Medications Prior to Admission   Medication Sig Dispense Refill Last Dose    Insulin Lispro, 1 Unit Dial, (HUMALOG KWIKPEN) 100 UNIT/ML Subcutaneous Solution Pen-injector Inject 30 Units into the skin daily with dinner. 3 mL 5 2/28/2024    betamethasone 0.1 % External Cream Apply 1 Application topically 2 (two) times daily. 15 g 0 2/28/2024    aspirin (ASPIRIN LOW DOSE) 81 MG Oral Chew Tab Chew 1.5 tablets (121.5 mg total) by mouth daily. 60 tablet 2 2/28/2024    insulin detemir (LEVEMIR FLEXPEN) 100 UNIT/ML Subcutaneous Solution Pen-injector Inject 14 Units into the skin every morning. Inject 74 units every night (divided in to two doses of 37 units each given at least two inches apart) 15 mL 1 2/29/2024    Multiple Vitamins-Minerals (MULTIVITAL OR) Take by mouth.   2/29/2024    Insulin Pen Needle 32G X 4 MM Does not apply Misc Inject 4 Needles into the skin in the morning, at noon, in the evening, and at bedtime. Use with flex pen as direct 200 each 2     Glucose  Blood (ONETOUCH VERIO) In Vitro Strip Check blood sugar 4x daily. 300 strip 3     Lancets Does not apply Misc Check blood sugar 4x daily. 200 each 5     Blood Glucose Monitoring Suppl (ONETOUCH VERIO FLEX SYSTEM) w/Device Does not apply Kit 1 kit 4 (four) times daily. 1 kit 0     Glucose Blood (ONETOUCH VERIO) In Vitro Strip 1 strip 4 (four) times daily. Use daily as directed. 100 strip 2     OneTouch Delica Lancets 33G Does not apply Misc 1 Lancet by Finger stick route daily. May substitute for covered brand only if prescribed brand os not covered. 100 each 2     Blood Pressure Monitoring (BLOOD PRESSURE CUFF) Does not apply Misc Take blood pressure once daily and record reading 1 each 0      Current Facility-Administered Medications Ordered in Epic   Medication Dose Route Frequency Provider Last Rate Last Admin    acetaminophen (Tylenol Extra Strength) tab 500 mg  500 mg Oral Q6H PRN Cally Husseinh T, DO        acetaminophen (Tylenol Extra Strength) tab 1,000 mg  1,000 mg Oral Q6H PRN Cally Husseinh T, DO        ibuprofen (Motrin) tab 600 mg  600 mg Oral Once PRN Jovita, Eloina T, DO        ondansetron (Zofran) 4 MG/2ML injection 4 mg  4 mg Intravenous Q6H PRN Jovita, Eloina T, DO        oxyTOCIN in sodium chloride 0.9% (Pitocin) 30 Units/500mL infusion premix  62.5-900 lex-units/min Intravenous Continuous Cally Husseinh T, DO        terbutaline (Brethine) 1 MG/ML injection 0.25 mg  0.25 mg Subcutaneous PRN Jovita Eloina T, DO        sodium citrate-citric acid (Bicitra) 500-334 MG/5ML oral solution 30 mL  30 mL Oral PRN Cally Husseinh T, DO        lidocaine PF (Xylocaine-MPF) 1% injection  30 mL Intradermal Once Cally Husseinh T, DO        lactated ringers infusion   Intravenous Continuous Cally Husseinh T,  mL/hr at 02/29/24 1855 New Bag at 02/29/24 1855    dextrose in lactated ringers 5% infusion   Intravenous PRN Cally Husseinh T, DO        lactated ringers IV bolus 500 mL  500 mL Intravenous PRN Cally Husseinh T, DO         nalbuphine (Nubain) 10 mg/mL injection 10 mg  10 mg Intramuscular Q6H PRN Dubyel, Eloina T, DO        And    hydrOXYzine 50 mg/mL injection 50 mg  50 mg Intramuscular Q6H PRN Dubyel, Eloina T, DO        fentaNYL (Sublimaze) 50 mcg/mL injection 100 mcg  100 mcg Intravenous Once PRN Dubyel, Eloina T, DO        fentaNYL (Sublimaze) 50 mcg/mL injection 50 mcg  50 mcg Intravenous Q30 Min PRN Dubyel, Eloina T, DO        ampicillin (Omnipen) 1 g in sodium chloride 0.9% 100 mL IVPB-MBP  1 g Intravenous Q4H Dubyel, Eloina T,  mL/hr at 02/29/24 1809 1 g at 02/29/24 1809    misoprostol (CYTOTEC) partial tablets 25 mcg  25 mcg Vaginal Q3H Dubyel, Eloina T, DO   25 mcg at 02/29/24 1312    dextrose in lactated ringers 5% infusion  50 mL/hr Intravenous Continuous PRN Shakira Platt MD        lactated ringers infusion   mL/hr Intravenous Continuous PRN Shakira Platt MD        sodium chloride 0.9% infusion  25 mL/hr Intravenous Continuous PRN Shakira Platt MD        glucose (Dex4) 15 GM/59ML oral liquid 15 g  15 g Oral Q15 Min PRN Shakira Platt MD        Or    glucose (Glutose) 40% oral gel 15 g  15 g Oral Q15 Min PRN Shakira Platt MD        Or    glucose-vitamin C (Dex-4) chewable tab 4 tablet  4 tablet Oral Q15 Min PRN Shakira Platt MD        Or    dextrose 50% injection 50 mL  50 mL Intravenous Q15 Min PRShakira Padron MD        Or    glucose (Dex4) 15 GM/59ML oral liquid 30 g  30 g Oral Q15 Min PRN Shakira Platt MD        Or    glucose (Glutose) 40% oral gel 30 g  30 g Oral Q15 Min PRShakira Padron MD        Or    glucose-vitamin C (Dex-4) chewable tab 8 tablet  8 tablet Oral Q15 Min PRN Shakira Platt MD        insulin regular human (Novolin R, Humulin R) 100 Units in sodium chloride 0.9% 100 mL standard infusion (100 mL)  0.5-5.5 Units/hr Intravenous Continuous Shakira Platt MD        dextrose in lactated ringers 5% infusion  50 mL/hr Intravenous Continuous PRN Eloina Hussein DO        lactated ringers infusion    mL/hr Intravenous Continuous PRN Dubyel, Eloina T, DO        sodium chloride 0.9% infusion  25 mL/hr Intravenous Continuous PRN Dubyel, Eloina T, DO        oxyTOCIN in sodium chloride 0.9% (Pitocin) 30 Units/500mL infusion premix  0.5-20 lex-units/min Intravenous Continuous Dubyel, Eloina T, DO 2 mL/hr at 02/29/24 1830 2 lex-units/min at 02/29/24 1830    lactated ringers IV bolus 1,000 mL  1,000 mL Intravenous Once Dubyel, Eloina T, DO 2,000 mL/hr at 02/29/24 1915 Rate Change at 02/29/24 1915    fentaNYL-bupivacaine 2 mcg/mL-0.125% in sodium chloride 0.9% 100 mL EPIDURAL infusion premix   Epidural Continuous Dubyel, Eloina T, DO        fentaNYL (Sublimaze) 50 mcg/mL injection 100 mcg  100 mcg Epidural Once Dubyel, Eloina T, DO        bupivacaine PF (Marcaine) 0.25% injection  20 mL Epidural Once Dubyel, Eloina T, DO        EPHEDrine (PF) 25 MG/5 ML injection 5 mg  5 mg Intravenous PRN Dubyel, Eloina T, DO        nalbuphine (Nubain) 10 mg/mL injection 2.5 mg  2.5 mg Intravenous Q15 Min PRN Dubyel, Eloina T, DO         No current [facility]-ordered outpatient medications on file.       No Known Allergies    Family History   Problem Relation Age of Onset    Thyroid disease Mother     Diabetes Maternal Grandmother     Diabetes Paternal Grandmother     Breast Cancer Paternal Grandmother 62        62    Hypertension Other         Family h/o Hypertension    Thyroid disease Other         Family h/o Thyroid Disease    Other (Other) Other         No Family h    Asthma Other         Family h/o Asthma    Diabetes Other         Family h/o Diabetes    Heart Disease Other         Family h/o Coronary Artery Disease    Glaucoma Neg      Social History     Socioeconomic History    Marital status: Single   Tobacco Use    Smoking status: Never    Smokeless tobacco: Never   Vaping Use    Vaping Use: Never used   Substance and Sexual Activity    Alcohol use: No     Alcohol/week: 0.0 standard drinks of alcohol    Drug use: No    Sexual activity: Yes      Birth control/protection: Implant     Comment: Nexplanon 2018   Other Topics Concern    Caffeine Concern No    Special Diet No    Exercise Yes    Seat Belt Yes       Available pre-op labs reviewed.  Lab Results   Component Value Date    WBC 13.1 (H) 02/29/2024    RBC 4.26 02/29/2024    HGB 10.1 (L) 02/29/2024    HCT 33.0 (L) 02/29/2024    MCV 77.5 (L) 02/29/2024    MCH 23.7 (L) 02/29/2024    MCHC 30.6 (L) 02/29/2024    RDW 16.0 (H) 02/29/2024    .0 02/29/2024     Lab Results   Component Value Date     02/29/2024    K 4.0 02/29/2024     02/29/2024    CO2 20.0 (L) 02/29/2024    BUN 6 (L) 02/29/2024    CREATSERUM 0.53 (L) 02/29/2024     (H) 02/29/2024    PGLU 82 02/29/2024    CA 9.1 02/29/2024          Vital Signs:  Body mass index is 37.28 kg/m².   height is 1.702 m (5' 7\") and weight is 108 kg (238 lb). Her oral temperature is 98.3 °F (36.8 °C). Her blood pressure is 147/84 and her pulse is 86. Her respiration is 18.   Vitals:    02/29/24 1315 02/29/24 1600 02/29/24 1730 02/29/24 1845   BP: 153/79 141/85  147/84   Pulse: 91 83  86   Resp:  18     Temp:  98.6 °F (37 °C) 98.3 °F (36.8 °C)    TempSrc:  Oral Oral    Weight:       Height:            Anesthesia Evaluation     Patient summary reviewed and Nursing notes reviewed    No history of anesthetic complications   Airway   Mallampati: II  TM distance: >3 FB  Neck ROM: full  Dental - Dentition appears grossly intact     Pulmonary - negative ROS and normal exam   Cardiovascular - normal exam  Exercise tolerance: good  (+) hypertension well controlled    Neuro/Psych - negative ROS     GI/Hepatic/Renal - negative ROS     Endo/Other    (+) diabetes mellitus (gestational) well controlled using insulin  Abdominal  - normal exam                 Anesthesia Plan:   ASA:  3  Emergent    Plan:   Epidural  Post-op Pain Management: Continuous epidural and PCEA  Informed Consent Plan and Risks Discussed With:  Patient      I have informed Mónica MICHAELS  Earl and/or legal guardian or family member of the nature of the anesthetic plan, benefits, risks including possible dental damage if relevant, major complications, and any alternative forms of anesthetic management.   All of the patient's questions were answered to the best of my ability. The patient desires the anesthetic management as planned.  SANA BOJORQUEZ MD  2/29/2024 8:29 PM  Present on Admission:   Pregnancy ([facility]CC)   Chronic hypertension affecting pregnancy ([facility])   GDM, likely pregestational

## 2024-03-01 NOTE — PROGRESS NOTES
Received patient into room via wheelchair and accompanied by significant other as well as nursing staff.  Pt transferred to bed. VSS WNL. ID bands matched with L&D RN. Patient oriented to unit, room, and call light within reach. POC reviewed with patient. Instructed to call for assistance when ready to void. Report received from CHRISTINE Gregg.

## 2024-03-01 NOTE — LACTATION NOTE
This note was copied from a baby's chart.  LACTATION NOTE - INFANT    Evaluation Type  Evaluation Type: Inpatient    Problems & Assessment  Problems Diagnosed or Identified: Sleepy  Problems: comment/detail: 2nd blood sugar slightly below normal limit; protocol restarted with next 2 checks WNL  Infant Assessment: Hunger cues present  Muscle tone: Appropriate for GA    Feeding Assessment  Summary Current Feeding: Breastfeeding exclusively  Breastfeeding Assessment: Assisted with breastfeeding w/mother's permission;Sustained nutrititive latch w/audible swallows;Coordinated suck/swallow;Tolerated feeding well  Breastfeeding Positions: left breast;cross cradle  Latch: Grasps breast, tongue down, lips flanged, rhythmic sucking  Audible Sucks/Swallows: Spontaneous and intermittent (24 hours old)  Type of Nipple: Everted (after stimulation)  Comfort (Breast/Nipple): Soft/non-tender  Hold (Positioning): No assist from staff, mother able to position/hold infant  LATCH Score: 10

## 2024-03-01 NOTE — PROGRESS NOTES
Labor progress note    Patient feeling intense contractions    Vitals:  Temp:  [97.9 °F (36.6 °C)-98.6 °F (37 °C)] 98.3 °F (36.8 °C)  Pulse:  [] 86  Resp:  [16-18] 18  BP: (139-153)/(79-85) 147/84    FHT: baseline 130s, moderate variability, + accelerations, no decelerations  [place]: q 2 min  Cervix: 3/50/-2    A/P:  27 yo  @ 38w0d, IOL  IOL -   - cont pitocin per protocol  - category 1 FHT    cHTN  - BP as noted above  - cont to monitor    GDMA2  - glucose managed by MFM, accuchecks 84, 75.    Eloina Hussein, DO

## 2024-03-01 NOTE — PROGRESS NOTES
Patient up to bathroom with assist x 2.  Voided 275.  Patient transferred to mother/baby room 354 per wheelchair in stable condition with baby and personal belongings.  Accompanied by significant other and staff.  Report given to CHRISTINE Arnold.

## 2024-03-01 NOTE — PLAN OF CARE
Problem: BIRTH - VAGINAL/ SECTION  Goal: Fetal and maternal status remain reassuring during the birth process  Description: INTERVENTIONS:  - Monitor vital signs  - Monitor fetal heart rate  - Monitor uterine activity  - Monitor labor progression (vaginal delivery)  - DVT prophylaxis (C/S delivery)  - Surgical antibiotic prophylaxis (C/S delivery)  3/1/2024 0302 by Catalina Lazo RN  Outcome: Progressing  3/1/2024 0302 by Catalina Lazo RN  Outcome: Progressing     Problem: PAIN - ADULT  Goal: Verbalizes/displays adequate comfort level or patient's stated pain goal  Description: INTERVENTIONS:  - Encourage pt to monitor pain and request assistance  - Assess pain using appropriate pain scale  - Administer analgesics based on type and severity of pain and evaluate response  - Implement non-pharmacological measures as appropriate and evaluate response  - Consider cultural and social influences on pain and pain management  - Manage/alleviate anxiety  - Utilize distraction and/or relaxation techniques  - Monitor for opioid side effects  - Notify MD/LIP if interventions unsuccessful or patient reports new pain  - Anticipate increased pain with activity and pre-medicate as appropriate  3/1/2024 0302 by Catalina Lazo RN  Outcome: Progressing  3/1/2024 0302 by Catalina Lazo RN  Outcome: Progressing     Problem: ANXIETY  Goal: Will report anxiety at manageable levels  Description: INTERVENTIONS:  - Administer medication as ordered  - Teach and rehearse alternative coping skills  - Provide emotional support with 1:1 interaction with staff  3/1/2024 0302 by Catalina Lazo RN  Outcome: Progressing  3/1/2024 0302 by Catalina Lazo RN  Outcome: Progressing     Problem: Patient Centered Care  Goal: Patient preferences are identified and integrated in the patient's plan of care  Description: Interventions:  - What would you like us to know as we care for you?   - Provide timely, complete, and accurate information  to patient/family  - Incorporate patient and family knowledge, values, beliefs, and cultural backgrounds into the planning and delivery of care  - Encourage patient/family to participate in care and decision-making at the level they choose  - Honor patient and family perspectives and choices  3/1/2024 0302 by Catalina Lazo RN  Outcome: Progressing  3/1/2024 0302 by Catalina Lazo RN  Outcome: Progressing     Problem: Patient/Family Goals  Goal: Patient/Family Long Term Goal  Description: Patient's Long Term Goal:     Interventions:  -   - See additional Care Plan goals for specific interventions  3/1/2024 0302 by Catalina Lazo RN  Outcome: Progressing  3/1/2024 0302 by Catalina Lazo RN  Outcome: Progressing  Goal: Patient/Family Short Term Goal  Description: Patient's Short Term Goal:     Interventions:   -  - See additional Care Plan goals for specific interventions  3/1/2024 0302 by Catalina Lazo RN  Outcome: Progressing  3/1/2024 0302 by Catalina Lazo RN  Outcome: Progressing     Problem: POSTPARTUM  Goal: Long Term Goal:Experiences normal postpartum course  Description: INTERVENTIONS:  - Assess and monitor vital signs and lab values.  - Assess fundus and lochia.  - Provide ice/sitz baths for perineum discomfort.  - Monitor healing of incision/episiotomy/laceration, and assess for signs and symptoms of infection and hematoma.  - Assess bladder function and monitor for bladder distention.  - Provide/instruct/assist with pericare as needed.  - Provide VTE prophylaxis as needed.  - Monitor bowel function.  - Encourage ambulation and provide assistance as needed.  - Assess and monitor emotional status and provide social service/psych resources as needed.  - Utilize standard precautions and use personal protective equipment as indicated. Ensure aseptic care of all intravenous lines and invasive tubes/drains.  - Obtain immunization and exposure to communicable diseases history.  Outcome: Progressing  Goal:  Optimize infant feeding at the breast  Description: INTERVENTIONS:  - Initiate breast feeding within first hour after birth.   - Monitor effectiveness of current breast feeding efforts.  - Assess support systems available to mother/family.  - Identify cultural beliefs/practices regarding lactation, letdown techniques, maternal food preferences.  - Assess mother's knowledge and previous experience with breast feeding.  - Provide information as needed about early infant feeding cues (e.g., rooting, lip smacking, sucking fingers/hand) versus late cue of crying.  - Discuss/demonstrate breast feeding aids (e.g., infant sling, nursing footstool/pillows, and breast pumps).  - Encourage mother/other family members to express feelings/concerns, and actively listen.  - Educate father/SO about benefits of breast feeding and how to manage common lactation challenges.  - Recommend avoidance of specific medications or substances incompatible with breast feeding.  - Assess and monitor for signs of nipple pain/trauma.  - Instruct and provide assistance with proper latch.  - Review techniques for milk expression (breast pumping) and storage of breast milk. Provide pumping equipment/supplies, instructions and assistance, as needed.  - Encourage rooming-in and breast feeding on demand.  - Encourage skin-to-skin contact.  - Provide LC support as needed.  - Assess for and manage engorgement.  - Provide breast feeding education handouts and information on community breast feeding support.   Outcome: Progressing  Goal: Establishment of adequate milk supply with medication/procedure interruptions  Description: INTERVENTIONS:  - Review techniques for milk expression (breast pumping).   - Provide pumping equipment/supplies, instructions, and assistance until it is safe to breastfeed infant.  Outcome: Progressing  Goal: Appropriate maternal -  bonding  Description: INTERVENTIONS:  - Assess caregiver- interactions.  - Assess  caregiver's emotional status and coping mechanisms.  - Encourage caregiver to participate in  daily care.  - Assess support systems available to mother/family.  - Provide /case management support as needed.  Outcome: Progressing

## 2024-03-01 NOTE — ANESTHESIA PROCEDURE NOTES
Labor Analgesia    Date/Time: 2/29/2024 8:05 PM    Performed by: Malik Hinojosa MD  Authorized by: Malik Hinojosa MD      General Information and Staff    Start Time:  2/29/2024 8:05 PM  End Time:  2/29/2024 8:10 PM  Anesthesiologist:  Malik Hinojosa MD  Performed by:  Anesthesiologist  Patient Location:  OB  Site Identification: surface landmarks    Reason for Block: labor epidural    Preanesthetic Checklist: patient identified, IV checked, site marked, risks and benefits discussed, monitors and equipment checked, pre-op evaluation, timeout performed, IV bolus, anesthesia consent and sterile technique used      Procedure Details    Patient Position:  Sitting  Prep: ChloraPrep and patient draped    Monitoring:  Heart rate, cardiac monitor and continuous pulse ox  Approach:  Midline    Epidural Needle    Injection Technique:  JOSÉ air  Needle Type:  Tuohy  Needle Gauge:  18 G  Needle Length:  3.5 in  Needle Insertion Depth:  5.5  Location:  L3-4    Spinal Needle      Catheter    Catheter Type:  Multi-orifice  Catheter Size:  20 G  Catheter at Skin Depth:  9.5  Test Dose:  Negative    Assessment    Sensory Level:  T10    Additional Comments

## 2024-03-01 NOTE — LACTATION NOTE
LACTATION NOTE - MOTHER      Evaluation Type: Inpatient    Problems identified  Problems identified: Knowledge deficit    Maternal history  Maternal history: Gestational diabetes;Induction of labor;Obesity  Other/comment: Chronic HTN    Breastfeeding goal  Breastfeeding goal: To maintain breast milk feeding per patient goal    Maternal Assessment  Bilateral Breasts: Wide spaced;Soft  Bilateral Nipples: Everted;Colostrum easily expressed  Prior breastfeeding experience (comment below): Multip;Successful  Breastfeeding Assistance: Breastfeeding assistance provided with permission    Pain assessment  Location/Comment: Denies    Guidelines for use of:  Other (comment): Mom independently BF, deep attachment to breast and several swallows observed. Discussed sleepy Day 1, STS and gentle stimulation of infant to increase suckling at breast. Encouraged parent led feedings and to offer expressed BM as needed via hand expression.

## 2024-03-01 NOTE — PROGRESS NOTES
Pain well controlled. No heavy bleeding    Patient Vitals for the past 24 hrs:   BP Temp Temp src Pulse Resp SpO2   03/01/24 1148 157/75 97.7 °F (36.5 °C) Oral 75 16 --   03/01/24 0722 152/78 98.4 °F (36.9 °C) Oral 75 16 --   03/01/24 0430 144/88 98.3 °F (36.8 °C) Oral 72 16 --   03/01/24 0230 155/85 98.1 °F (36.7 °C) Oral 82 16 --   03/01/24 0115 148/69 -- -- 93 -- --   03/01/24 0000 159/77 -- -- 99 -- 99 %   02/29/24 2345 -- -- -- 97 -- 99 %   02/29/24 2337 147/68 -- -- 105 -- 98 %   02/29/24 2330 147/68 -- -- 113 -- 100 %   02/29/24 2315 139/73 -- -- 100 -- 100 %   02/29/24 2300 140/77 98 °F (36.7 °C) Oral 99 -- 100 %   02/29/24 2245 144/80 -- -- 92 -- 100 %   02/29/24 2230 124/60 -- -- 83 -- 99 %   02/29/24 2215 125/62 -- -- 113 -- 100 %   02/29/24 2200 129/80 -- -- 89 -- 98 %   02/29/24 2145 132/75 -- -- 92 -- 99 %   02/29/24 2130 134/71 97.9 °F (36.6 °C) Oral 100 -- 99 %   02/29/24 2124 146/72 -- -- 94 -- 98 %   02/29/24 2115 146/72 -- -- 96 -- 100 %   02/29/24 2100 159/70 -- -- 107 -- 100 %   02/29/24 2045 117/64 -- -- 93 -- 98 %   02/29/24 2030 129/50 -- -- 94 -- 99 %   02/29/24 2015 (!) 169/105 -- -- 110 -- 100 %   02/29/24 2000 153/79 -- -- 85 -- 100 %   02/29/24 1945 -- -- -- 90 -- 99 %   02/29/24 1930 -- 98 °F (36.7 °C) Oral -- -- --   02/29/24 1845 147/84 -- -- 86 -- --   02/29/24 1730 -- 98.3 °F (36.8 °C) Oral -- -- --   02/29/24 1600 141/85 98.6 °F (37 °C) Oral 83 18 --   02/29/24 1315 153/79 -- -- 91 -- --     ABD: soft,  nontender  EXT: no calf tenderness    PPD#1  CHTN- no meds.

## 2024-03-01 NOTE — CM/SW NOTE
SW self referral due to finances/[facility] resources    SW met with patient and FOB  bedside.  SW confirmed face sheet contact as correct.    Baby boy/girl name: Baby boy Enmanuel  Date & time of delivery:  Delivery method:Normal spontaneous vaginal delivery   Siblings age:9 and 7 tr old    Patient employed: Yes  Length of maternity leave:12 weeks    Father of baby employed:Yes  Length of paternity leave: 4 weeks    Breast or formula feed:Breast and formula feed    Pediatrician:Dr. Jolly  SW encouraged pt to schedule infant first appointment (usually within 48 hours of discharge) prior to pt discharge. Pt expressed understanding.     Infant Insurance:Medicaid  [employer] [facility] contacted:Yes    Mental Health History: Denied    Medications:n/a    Therapist:n/a    Psychiatrist:n/a    SW discussed signs, symptoms and risks associated with post partum depression & anxiety.  SW provided pt with PMAD resources.  Other resources provided:[employer] transportation and mental health resources.  [facility] and [facility] resources.    Patient support system:FOB and extended family    Patient denied current questions/needs from SW.    SW/CM to remain available for support and/or discharge planning.      Britt Moulton, MSW, LSW  Social Work   Ext:#[phone]

## 2024-03-02 VITALS
HEIGHT: 67 IN | HEART RATE: 88 BPM | RESPIRATION RATE: 14 BRPM | DIASTOLIC BLOOD PRESSURE: 70 MMHG | SYSTOLIC BLOOD PRESSURE: 127 MMHG | OXYGEN SATURATION: 99 % | TEMPERATURE: 98 F | WEIGHT: 238 LBS | BODY MASS INDEX: 37.35 KG/M2

## 2024-03-02 LAB
BASOPHILS # BLD AUTO: 0.03 X10(3) UL (ref 0–0.2)
BASOPHILS NFR BLD AUTO: 0.2 %
DEPRECATED RDW RBC AUTO: 44.2 FL (ref 35.1–46.3)
EOSINOPHIL # BLD AUTO: 0.28 X10(3) UL (ref 0–0.7)
EOSINOPHIL NFR BLD AUTO: 2.3 %
ERYTHROCYTE [DISTWIDTH] IN BLOOD BY AUTOMATED COUNT: 16 % (ref 11–15)
GLUCOSE BLDC GLUCOMTR-MCNC: 105 MG/DL (ref 70–99)
HCT VFR BLD AUTO: 31.9 %
HGB BLD-MCNC: 10.1 G/DL
IMM GRANULOCYTES # BLD AUTO: 0.11 X10(3) UL (ref 0–1)
IMM GRANULOCYTES NFR BLD: 0.9 %
LYMPHOCYTES # BLD AUTO: 3.16 X10(3) UL (ref 1–4)
LYMPHOCYTES NFR BLD AUTO: 25.6 %
MCH RBC QN AUTO: 24.3 PG (ref 26–34)
MCHC RBC AUTO-ENTMCNC: 31.7 G/DL (ref 31–37)
MCV RBC AUTO: 76.9 FL
MONOCYTES # BLD AUTO: 1.18 X10(3) UL (ref 0.1–1)
MONOCYTES NFR BLD AUTO: 9.6 %
NEUTROPHILS # BLD AUTO: 7.57 X10 (3) UL (ref 1.5–7.7)
NEUTROPHILS # BLD AUTO: 7.57 X10(3) UL (ref 1.5–7.7)
NEUTROPHILS NFR BLD AUTO: 61.4 %
PLATELET # BLD AUTO: 256 10(3)UL (ref 150–450)
RBC # BLD AUTO: 4.15 X10(6)UL
WBC # BLD AUTO: 12.3 X10(3) UL (ref 4–11)

## 2024-03-02 RX ORDER — IBUPROFEN 600 MG/1
600 TABLET ORAL EVERY 6 HOURS PRN
Qty: 60 TABLET | Refills: 0 | Status: SHIPPED | OUTPATIENT
Start: 2024-03-02

## 2024-03-02 RX ORDER — ACETAMINOPHEN 325 MG/1
325 TABLET ORAL EVERY 6 HOURS PRN
Qty: 60 TABLET | Refills: 0 | Status: SHIPPED | OUTPATIENT
Start: 2024-03-02

## 2024-03-02 RX ORDER — POLYETHYLENE GLYCOL 3350 17 G/17G
17 POWDER, FOR SOLUTION ORAL 2 TIMES DAILY PRN
Qty: 60 EACH | Refills: 0 | Status: SHIPPED | OUTPATIENT
Start: 2024-03-02 | End: 2024-04-01

## 2024-03-02 RX ORDER — DOCUSATE SODIUM 100 MG/1
100 CAPSULE, LIQUID FILLED ORAL 2 TIMES DAILY
Qty: 60 CAPSULE | Refills: 0 | Status: SHIPPED | OUTPATIENT
Start: 2024-03-02 | End: 2024-04-01

## 2024-03-02 NOTE — DISCHARGE SUMMARY
[facility]  part of [facility]    Discharge Summary    Mónica Earl Patient Status:  Inpatient    1995 MRN [id number]   Location [facility] 3SE Attending Eloina Hussein, DO   Hosp Day # 2 PCP Nanci Dsouza MD     Date of Admission: 2024    Date of Discharge: 24    Admission Diagnoses: IOL  Pregnancy (HCC) at 38w0d     Secondary Diagnosis: Chronic HTN. GDMA2, obesity.     Primary OB Clinician: [facility]    Hospital Course:     EDC: Estimated Date of Delivery: 3/14/24    Gestational Age: 38w0d    Date of Delivery: 24    Antepartum complications: gestational diabetes and Chronic HTN.     Delivered By: Dr. Eloina Hussein     Delivery Type:   Tubal Ligation: n/a    Baby: Liveborn male,     Apgars:  1 minute:   9                 5 minutes: 9  Anesthesia: epidural    Consultants         Provider   Role Specialty     Shakira Platt MD  Consulting Physician Maternal Fetal Medicine              Intrapartum Complications: None    Laceration: periurethral not needing repair.     Episiotomy: none    Placenta: spontaneous    Feeding Method: both breast and bottle fed     Rh Immune Globulin Given: no    Rubella Vaccine Given: no        Discharge Plan:   Discharge Condition: Good  Early Discharge:  NO    Discharge medications:  Current Discharge Medication List        New Orders    Details   ibuprofen 600 MG Oral Tab Take 1 tablet (600 mg total) by mouth every 6 (six) hours as needed for Pain.      acetaminophen 325 MG Oral Tab Take 1 tablet (325 mg total) by mouth every 6 (six) hours as needed for Pain.      docusate sodium 100 MG Oral Cap Take 1 capsule (100 mg total) by mouth 2 (two) times daily.      polyethylene glycol, PEG 3350, 17 g Oral Powd Pack Take 17 g by mouth 2 (two) times daily as needed.           Home Meds - Unchanged    Details   betamethasone 0.1 % External Cream Apply 1 Application topically 2 (two) times daily.      aspirin (ASPIRIN LOW DOSE)  81 MG Oral Chew Tab Chew 1.5 tablets (121.5 mg total) by mouth daily.      Multiple Vitamins-Minerals (MULTIVITAL OR) Take by mouth.      Insulin Pen Needle 32G X 4 MM Does not apply Misc Inject 4 Needles into the skin in the morning, at noon, in the evening, and at bedtime. Use with flex pen as direct      !! Lancets Does not apply Misc Check blood sugar 4x daily.      Blood Glucose Monitoring Suppl (ONETOUCH VERIO FLEX SYSTEM) w/Device Does not apply Kit 1 kit 4 (four) times daily.      !! OneTouch Delica Lancets 33G Does not apply Misc 1 Lancet by Finger stick route daily. May substitute for covered brand only if prescribed brand os not covered.      Blood Pressure Monitoring (BLOOD PRESSURE CUFF) Does not apply Misc Take blood pressure once daily and record reading       !! - Potential duplicate medications found. Please discuss with provider.                Discharge Diet: Diabetic diet    Discharge Activity: As tolerated    Follow up:      Follow-up Information       [facility]. Call.    Specialty: Pediatrics  Why: As needed  Contact information:  [address] [facility] [address] [phone]  Additional information:  Masks are optional for all patients and visitors, unless otherwise indicated.             Eloina Hussein, . Schedule an appointment as soon as possible for a visit in 6 week(s).    Specialty: OBSTETRICS & GYNECOLOGY  Why: Routine postpartum visit  Contact information:  [address] [id number] [phone]               Eloina Hussein DO. Schedule an appointment as soon as possible for a visit.    Specialty: OBSTETRICS & GYNECOLOGY  Why: Nurse blood pressure check.  Contact information:  [address] [id number] [phone]                             Follow up Labs: blood presure check within 3 days. GTT in 6-12 weeks.         Other Discharge Instructions:           Post Vaginal Delivery Home Care Instructions      We hope you were pleased with your care at [facility].  We wish you the best outcome and overall experience with the delivery of your baby.  These instructions will help to minimize pain, limit the risk for an infection, and improve the likelihood of a successful recovery.    What to Expect:  Abdominal cramping after delivery especially if you are breastfeeding.   Vaginal bleeding for about 4-6 weeks that may be followed by a yellow or white discharge for a few more weeks.  Your period will resume in approximately 6-8 weeks, unless you are breastfeeding.    If you are bottle feeding, you may notice breast engorgement in about 3 days.  Your breast may be sore and hard. Please wear a tight fitted bra or sports bra for 24-36 hours to help prevent your breast from producing milk, and use ice packs to relive any discomfort.  If you are breastfeeding, nipple dryness is very common the first few days.    Constipation is common after having a baby.  Please increase fluid and fiber in your diet.      Over-The-Counter Medication  Non-prescription anti-inflammatory medications can also help to ease the pain.  You may take Aleve, Tylenol or Ibuprofen   Colace or Metamucil for Constipation  Lanolin for dry nipples  Tucks, Witch Hazel and Epifoam for vaginal/perineum discomfort.   Drink a full glass of water with oral medication and take as directed.    Wound Care  The following instructions will promote proper healing and help to prevent infection  Vaginal/perineum Care: Sitz Bath for 15mins, 2-3 times a day,    Bathing/Showers  You may resume showers  No baths, swimming, hot tubs until your post-partum visit    Home Medication  Resume your home medications as instructed    Diet  Resume your normal diabetic diet    Activity  Refrain from vaginal intercourse, vaginal suppositories, tampon use or douches until after your post-partum visit.  No exercising for 4 weeks  You may climb stairs minimally for the 1st  week.    Do not do heavy housework for at least 2-3 weeks    Return to Work or School  You may return to work in approximately 6 weeks  Contact your obstetrician’s office, if you need a medical release. ([phone])    Driving  Avoid driving if you are taking narcotics for pain relief.    Follow-up Appointment with Your Obstetrician  Call your obstetrician’s office today for an appointment in 3-4 days for a nurse blood pressure check.     The number is [phone].  Verify your appointment date, day, time, and location.  At your 1st post-partum office visit:  Your progress will be evaluated, findings reviewed, and any additional concerns and instructions will be discussed.    Questions or Concerns  Call your obstetrician’s office if you experience the following:  Severe pain not controlled by pain medication  Blood pressure greater than 160/110 either number.   Foul smelling vaginal discharge  Heavy bleeding  Shortness of breath  Fever  Redness, increased swelling or drainage from your incision  Crying and periods of sadness that prevents you from caring for yourself and your baby  Burning sensation during urination or inability to urinate  Swelling, redness or abnormal warmth to your leg/calf  Please call ([phone]). If your call is made after office hours, a physician will be available to help you.  There is always a provider covering our patients.    Thank you for coming to [facility] to start your new family.  The nurses and the anesthesiologists try very hard to make sure you receive the best care possible.  Your trust in them as well as us is greatly appreciated.    Thanks so much,   The Providers of [facility] Obstetrics and Gynecology           Dr. Jc Leyva MD    [facility] OBGYN     This note was created by [employer] voice recognition. Errors in content may be related to improper recognition by the system; efforts to review and correct have been done but errors may still exist. Please be  advised the primary purpose of this note is for me to communicate medical care. Standard sentence structure is not always used. Medical terminology and medical abbreviations may be used. There may be grammatical, typographical, and automated fill ins that may have errors missed in proofreading.

## 2024-03-02 NOTE — PLAN OF CARE
Problem: BIRTH - VAGINAL/ SECTION  Goal: Fetal and maternal status remain reassuring during the birth process  Description: INTERVENTIONS:  - Monitor vital signs  - Monitor fetal heart rate  - Monitor uterine activity  - Monitor labor progression (vaginal delivery)  - DVT prophylaxis (C/S delivery)  - Surgical antibiotic prophylaxis (C/S delivery)  Outcome: Completed     Problem: PAIN - ADULT  Goal: Verbalizes/displays adequate comfort level or patient's stated pain goal  Description: INTERVENTIONS:  - Encourage pt to monitor pain and request assistance  - Assess pain using appropriate pain scale  - Administer analgesics based on type and severity of pain and evaluate response  - Implement non-pharmacological measures as appropriate and evaluate response  - Consider cultural and social influences on pain and pain management  - Manage/alleviate anxiety  - Utilize distraction and/or relaxation techniques  - Monitor for opioid side effects  - Notify MD/LIP if interventions unsuccessful or patient reports new pain  - Anticipate increased pain with activity and pre-medicate as appropriate  Outcome: Progressing     Problem: ANXIETY  Goal: Will report anxiety at manageable levels  Description: INTERVENTIONS:  - Administer medication as ordered  - Teach and rehearse alternative coping skills  - Provide emotional support with 1:1 interaction with staff  Outcome: Progressing     Problem: Patient Centered Care  Goal: Patient preferences are identified and integrated in the patient's plan of care  Description: Interventions:  - What would you like us to know as we care for you?   - Provide timely, complete, and accurate information to patient/family  - Incorporate patient and family knowledge, values, beliefs, and cultural backgrounds into the planning and delivery of care  - Encourage patient/family to participate in care and decision-making at the level they choose  - Honor patient and family perspectives and  choices  Outcome: Progressing     Problem: Patient/Family Goals  Goal: Patient/Family Long Term Goal  Description: Patient's Long Term Goal:     Interventions:  -   - See additional Care Plan goals for specific interventions  Outcome: Progressing  Goal: Patient/Family Short Term Goal  Description: Patient's Short Term Goal:     Interventions:   -   - See additional Care Plan goals for specific interventions  Outcome: Progressing     Problem: POSTPARTUM  Goal: Long Term Goal:Experiences normal postpartum course  Description: INTERVENTIONS:  - Assess and monitor vital signs and lab values.  - Assess fundus and lochia.  - Provide ice/sitz baths for perineum discomfort.  - Monitor healing of incision/episiotomy/laceration, and assess for signs and symptoms of infection and hematoma.  - Assess bladder function and monitor for bladder distention.  - Provide/instruct/assist with pericare as needed.  - Provide VTE prophylaxis as needed.  - Monitor bowel function.  - Encourage ambulation and provide assistance as needed.  - Assess and monitor emotional status and provide social service/psych resources as needed.  - Utilize standard precautions and use personal protective equipment as indicated. Ensure aseptic care of all intravenous lines and invasive tubes/drains.  - Obtain immunization and exposure to communicable diseases history.  Outcome: Progressing  Goal: Optimize infant feeding at the breast  Description: INTERVENTIONS:  - Initiate breast feeding within first hour after birth.   - Monitor effectiveness of current breast feeding efforts.  - Assess support systems available to mother/family.  - Identify cultural beliefs/practices regarding lactation, letdown techniques, maternal food preferences.  - Assess mother's knowledge and previous experience with breast feeding.  - Provide information as needed about early infant feeding cues (e.g., rooting, lip smacking, sucking fingers/hand) versus late cue of crying.  -  Discuss/demonstrate breast feeding aids (e.g., infant sling, nursing footstool/pillows, and breast pumps).  - Encourage mother/other family members to express feelings/concerns, and actively listen.  - Educate father/SO about benefits of breast feeding and how to manage common lactation challenges.  - Recommend avoidance of specific medications or substances incompatible with breast feeding.  - Assess and monitor for signs of nipple pain/trauma.  - Instruct and provide assistance with proper latch.  - Review techniques for milk expression (breast pumping) and storage of breast milk. Provide pumping equipment/supplies, instructions and assistance, as needed.  - Encourage rooming-in and breast feeding on demand.  - Encourage skin-to-skin contact.  - Provide LC support as needed.  - Assess for and manage engorgement.  - Provide breast feeding education handouts and information on community breast feeding support.   Outcome: Progressing  Goal: Establishment of adequate milk supply with medication/procedure interruptions  Description: INTERVENTIONS:  - Review techniques for milk expression (breast pumping).   - Provide pumping equipment/supplies, instructions, and assistance until it is safe to breastfeed infant.  Outcome: Progressing  Goal: Appropriate maternal -  bonding  Description: INTERVENTIONS:  - Assess caregiver- interactions.  - Assess caregiver's emotional status and coping mechanisms.  - Encourage caregiver to participate in  daily care.  - Assess support systems available to mother/family.  - Provide /case management support as needed.  Outcome: Progressing

## 2024-03-02 NOTE — DISCHARGE INSTRUCTIONS
Post Vaginal Delivery Home Care Instructions     We hope you were pleased with your care at [facility].  We wish you the best outcome and overall experience with the delivery of your baby.  These instructions will help to minimize pain, limit the risk for an infection, and improve the likelihood of a successful recovery.    What to Expect:  Abdominal cramping after delivery especially if you are breastfeeding.   Vaginal bleeding for about 4-6 weeks that may be followed by a yellow or white discharge for a few more weeks.  Your period will resume in approximately 6-8 weeks, unless you are breastfeeding.    If you are bottle feeding, you may notice breast engorgement in about 3 days.  Your breast may be sore and hard. Please wear a tight fitted bra or sports bra for 24-36 hours to help prevent your breast from producing milk, and use ice packs to relive any discomfort.  If you are breastfeeding, nipple dryness is very common the first few days.    Constipation is common after having a baby.  Please increase fluid and fiber in your diet.      Over-The-Counter Medication  Non-prescription anti-inflammatory medications can also help to ease the pain.  You may take Aleve, Tylenol or Ibuprofen   Colace or Metamucil for Constipation  Lanolin for dry nipples  Tucks, Witch Hazel and Epifoam for vaginal/perineum discomfort.   Drink a full glass of water with oral medication and take as directed.    Wound Care  The following instructions will promote proper healing and help to prevent infection  Vaginal/perineum Care: Sitz Bath for 15mins, 2-3 times a day,    Bathing/Showers  You may resume showers  No baths, swimming, hot tubs until your post-partum visit    Home Medication  Resume your home medications as instructed    Diet  Resume your normal diabetic diet    Activity  Refrain from vaginal intercourse, vaginal suppositories, tampon use or douches until after your post-partum visit.  No exercising for 4  weeks  You may climb stairs minimally for the 1st week.    Do not do heavy housework for at least 2-3 weeks    Return to Work or School  You may return to work in approximately 6 weeks  Contact your obstetrician’s office, if you need a medical release. ([phone])    Driving  Avoid driving if you are taking narcotics for pain relief.    Follow-up Appointment with Your Obstetrician  Call your obstetrician’s office today for an appointment in 3-4 days for a nurse blood pressure check.     The number is [phone].  Verify your appointment date, day, time, and location.  At your 1st post-partum office visit:  Your progress will be evaluated, findings reviewed, and any additional concerns and instructions will be discussed.    Questions or Concerns  Call your obstetrician’s office if you experience the following:  Severe pain not controlled by pain medication  Blood pressure greater than 160/110 either number.   Foul smelling vaginal discharge  Heavy bleeding  Shortness of breath  Fever  Redness, increased swelling or drainage from your incision  Crying and periods of sadness that prevents you from caring for yourself and your baby  Burning sensation during urination or inability to urinate  Swelling, redness or abnormal warmth to your leg/calf  Please call ([phone]). If your call is made after office hours, a physician will be available to help you.  There is always a provider covering our patients.    Thank you for coming to [facility] to start your new family.  The nurses and the anesthesiologists try very hard to make sure you receive the best care possible.  Your trust in them as well as us is greatly appreciated.    Thanks so much,   The Providers of [facility] Obstetrics and Gynecology

## 2024-03-02 NOTE — PROGRESS NOTES
[facility]  Obstetrics and Gynecology    OB/GYN: Postpartum Progress Note     SUBJECTIVE:  Patient is a 28 year old  female who is s/p . She is PPD# 2.   Doing well. Noted no complaints. Denies fever, chills, N, V, chest pain and SOB. Bleeding has been stable. Voiding without difficulty.  Passing flatus.  No Bm. Tolerating general diet. Ambulating without difficulty. Desires circumcision for her son and agrees to the consent at the bottom of this note. Desires discharge home today.     OBJECTIVE:  Vitals:    24 2000 24 0005 24 0404 24 0844   BP: 143/80 146/81 155/90 127/70   Pulse: 85 93 82 88   Resp: 17   14   Temp: 97.9 °F (36.6 °C)   98.3 °F (36.8 °C)   TempSrc: Oral   Oral   SpO2:       Weight:       Height:           Physical Exam:    General:    alert, appears stated age, and cooperative   Lochia:  appropriate   Uterine Fundus:   firm at the umbilicus   Perineum:  healing well    DVT Evaluation:  No evidence of DVT seen on physical exam.     Urinary output is adequate. (When recorded)    Labs:  Recent Labs   Lab 24  0551   RBC 4.15   HGB 10.1*   HCT 31.9*   MCV 76.9*   MCH 24.3*   MCHC 31.7   RDW 16.0*   NEPRELIM 7.57   WBC 12.3*   .0       ASSESSMENT/PLAN:  Patient is a 28 year old  female who is s/p  PPD# 2.     Doing well   Continue routine postpartum care  Vitals per routine   Encourage ambulation   Plan for discharge to home today. RN blood presure check visit within 3-4 days of discharge.   Follow up in 6 weeks      Dr. Jc Leyva MD    [facility] OBGYN     This note was created by [employer] voice recognition. Errors in content may be related to improper recognition by the system; efforts to review and correct have been done but errors may still exist. Please be advised the primary purpose of this note is for me to communicate medical care. Standard sentence structure is not always used. Medical terminology and medical  abbreviations may be used. There may be grammatical, typographical, and automated fill ins that may have errors missed in proofreading.       The patient's mother had a male infant, and does desire circumcision.   She understands there is no medical indication for circumcision. We discussed AAP opinion on procedure as well. She was consented for infant circumcision risks including, but not limited to: bleeding, infection, trauma to other tissue, and need for further procedures.  The patient expressed understanding, questions were answered and she wishes to proceed with the procedure for her son.

## 2024-03-02 NOTE — PLAN OF CARE
Problem: BIRTH - VAGINAL/ SECTION  Goal: Fetal and maternal status remain reassuring during the birth process  Description: INTERVENTIONS:  - Monitor vital signs  - Monitor fetal heart rate  - Monitor uterine activity  - Monitor labor progression (vaginal delivery)  - DVT prophylaxis (C/S delivery)  - Surgical antibiotic prophylaxis (C/S delivery)  Outcome: Progressing     Problem: PAIN - ADULT  Goal: Verbalizes/displays adequate comfort level or patient's stated pain goal  Description: INTERVENTIONS:  - Encourage pt to monitor pain and request assistance  - Assess pain using appropriate pain scale  - Administer analgesics based on type and severity of pain and evaluate response  - Implement non-pharmacological measures as appropriate and evaluate response  - Consider cultural and social influences on pain and pain management  - Manage/alleviate anxiety  - Utilize distraction and/or relaxation techniques  - Monitor for opioid side effects  - Notify MD/LIP if interventions unsuccessful or patient reports new pain  - Anticipate increased pain with activity and pre-medicate as appropriate  Outcome: Progressing     Problem: ANXIETY  Goal: Will report anxiety at manageable levels  Description: INTERVENTIONS:  - Administer medication as ordered  - Teach and rehearse alternative coping skills  - Provide emotional support with 1:1 interaction with staff  Outcome: Progressing     Problem: Patient Centered Care  Goal: Patient preferences are identified and integrated in the patient's plan of care  Description: Interventions:  - What would you like us to know as we care for you?   - Provide timely, complete, and accurate information to patient/family  - Incorporate patient and family knowledge, values, beliefs, and cultural backgrounds into the planning and delivery of care  - Encourage patient/family to participate in care and decision-making at the level they choose  - Honor patient and family perspectives and  choices  Outcome: Progressing     Problem: Patient/Family Goals  Goal: Patient/Family Long Term Goal  Description: Patient's Long Term Goal:     Interventions:  -   - See additional Care Plan goals for specific interventions  Outcome: Progressing  Goal: Patient/Family Short Term Goal  Description: Patient's Short Term Goal:   Interventions:   -   - See additional Care Plan goals for specific interventions  Outcome: Progressing     Problem: POSTPARTUM  Goal: Long Term Goal:Experiences normal postpartum course  Description: INTERVENTIONS:  - Assess and monitor vital signs and lab values.  - Assess fundus and lochia.  - Provide ice/sitz baths for perineum discomfort.  - Monitor healing of incision/episiotomy/laceration, and assess for signs and symptoms of infection and hematoma.  - Assess bladder function and monitor for bladder distention.  - Provide/instruct/assist with pericare as needed.  - Provide VTE prophylaxis as needed.  - Monitor bowel function.  - Encourage ambulation and provide assistance as needed.  - Assess and monitor emotional status and provide social service/psych resources as needed.  - Utilize standard precautions and use personal protective equipment as indicated. Ensure aseptic care of all intravenous lines and invasive tubes/drains.  - Obtain immunization and exposure to communicable diseases history.  Outcome: Progressing  Goal: Optimize infant feeding at the breast  Description: INTERVENTIONS:  - Initiate breast feeding within first hour after birth.   - Monitor effectiveness of current breast feeding efforts.  - Assess support systems available to mother/family.  - Identify cultural beliefs/practices regarding lactation, letdown techniques, maternal food preferences.  - Assess mother's knowledge and previous experience with breast feeding.  - Provide information as needed about early infant feeding cues (e.g., rooting, lip smacking, sucking fingers/hand) versus late cue of crying.  -  Discuss/demonstrate breast feeding aids (e.g., infant sling, nursing footstool/pillows, and breast pumps).  - Encourage mother/other family members to express feelings/concerns, and actively listen.  - Educate father/SO about benefits of breast feeding and how to manage common lactation challenges.  - Recommend avoidance of specific medications or substances incompatible with breast feeding.  - Assess and monitor for signs of nipple pain/trauma.  - Instruct and provide assistance with proper latch.  - Review techniques for milk expression (breast pumping) and storage of breast milk. Provide pumping equipment/supplies, instructions and assistance, as needed.  - Encourage rooming-in and breast feeding on demand.  - Encourage skin-to-skin contact.  - Provide LC support as needed.  - Assess for and manage engorgement.  - Provide breast feeding education handouts and information on community breast feeding support.   Outcome: Progressing  Goal: Establishment of adequate milk supply with medication/procedure interruptions  Description: INTERVENTIONS:  - Review techniques for milk expression (breast pumping).   - Provide pumping equipment/supplies, instructions, and assistance until it is safe to breastfeed infant.  Outcome: Progressing  Goal: Appropriate maternal -  bonding  Description: INTERVENTIONS:  - Assess caregiver- interactions.  - Assess caregiver's emotional status and coping mechanisms.  - Encourage caregiver to participate in  daily care.  - Assess support systems available to mother/family.  - Provide /case management support as needed.  Outcome: Progressing

## 2024-03-03 NOTE — DISCHARGE PLANNING
Discharge order received from MD. Mom in stable condition. Discharge instructions given regarding pelvic rest, bleeding expectations, preeclampsia signs and symptoms, OB followup, incisional care, and pain medication. Patient verbalized understanding. ID band removed and verified against baby's. Patient instructed to put baby in carseat and then let staff know when ready to leave and then have the carseat checked.

## 2024-03-05 ENCOUNTER — NURSE ONLY (OUTPATIENT)
Dept: OBGYN CLINIC | Facility: CLINIC | Age: 29
End: 2024-03-05
Payer: MEDICAID

## 2024-03-05 ENCOUNTER — PATIENT OUTREACH (OUTPATIENT)
Dept: CASE MANAGEMENT | Age: 29
End: 2024-03-05

## 2024-03-05 VITALS — SYSTOLIC BLOOD PRESSURE: 136 MMHG | DIASTOLIC BLOOD PRESSURE: 80 MMHG

## 2024-03-05 NOTE — PROGRESS NOTES
GERI Post Partum apt request (delivered 02/29)    Dr Eloina John [place] [address]  [phone]  Follow up 6 weeks  Apt made:  Tue 04/09 @10:00am - Dora  Confirmed w/pt  Closing encounter

## 2024-03-05 NOTE — PROGRESS NOTES
Pt presented for BP check.  on . Pt denies visual disturbances, epigastric pain, HA, Pt's BP on left arm was 136/80. RN reported BP to JN, who advised that pt should check her BP BID and call the office if her systolic is over 160 or her diastolic is over 110 (written down for pt). RN told pt to call if she has HA, visual disturbances or epigastric pain. Pt verbalized understanding and agreed with POC.

## 2024-04-09 ENCOUNTER — POSTPARTUM (OUTPATIENT)
Dept: OBGYN CLINIC | Facility: CLINIC | Age: 29
End: 2024-04-09
Payer: MEDICAID

## 2024-04-09 VITALS — DIASTOLIC BLOOD PRESSURE: 88 MMHG | SYSTOLIC BLOOD PRESSURE: 140 MMHG

## 2024-04-09 DIAGNOSIS — Z86.32 HISTORY OF GESTATIONAL DIABETES: ICD-10-CM

## 2024-04-09 PROBLEM — Z34.90 PREGNANCY (HCC): Status: RESOLVED | Noted: 2024-02-29 | Resolved: 2024-04-09

## 2024-04-09 PROBLEM — Z34.90 ENCOUNTER FOR INDUCTION OF LABOR (HCC): Status: RESOLVED | Noted: 2024-02-29 | Resolved: 2024-04-09

## 2024-04-09 PROBLEM — O99.213 OBESITY AFFECTING PREGNANCY IN THIRD TRIMESTER (HCC): Chronic | Status: RESOLVED | Noted: 2023-10-03 | Resolved: 2024-04-09

## 2024-04-09 PROBLEM — Z91.89 AT RISK FOR ANEUPLOIDY: Chronic | Status: RESOLVED | Noted: 2023-10-28 | Resolved: 2024-04-09

## 2024-04-09 PROBLEM — O32.0XX0 UNSTABLE FETAL LIE (HCC): Status: RESOLVED | Noted: 2024-02-21 | Resolved: 2024-04-09

## 2024-04-09 PROBLEM — O24.419 GDM, CLASS A2 (HCC): Chronic | Status: RESOLVED | Noted: 2023-09-15 | Resolved: 2024-04-09

## 2024-04-09 NOTE — PROGRESS NOTES
[facility]  Obstetrics and Gynecology   Postpartum Progress Note    Subjective:     Mónica Earl is a 28 year old  female who is s/p  on 24. Her pregnancy was complicated by cHTN (no meds) and GDMA2. She reports doing well. Baby sone doing well and bottle feeding. The patient reports vagina bleeding resolved. The patient denies emotional concerns.     Review of Systems:  General: denies fevers, chills, fatigue and malaise.   Respiratory: denies SOB, dyspnea, cough or wheezing  Cardiovascular: denies chest pain, palpitations, exercise intolerance   GI:denies abdominal pain, diarrhea, constipation  : denies dysuria, hematuria, increased urinary frequency. denies abnormal uterine bleeding or vaginal discharge.       Objective:     Vitals:    24 0956   BP: 140/88       There is no height or weight on file to calculate BMI.    GENERAL: well developed, well nourished, in no apparent distress, alert and orientated X 3  PSYCH: mood and affect stable   SKIN: no rashes, no lesions  HEENT: normal  LUNGS: respiration unlabored  CARDIOVASCULAR: no peripheral edema or varicosities, skin warm and dry  ABDOMEN: Soft, non distended; non tender, no masses  GYNE/:   External Genitalia: normal, no lesions, good perineal support  Urethra: meatus normal   Bladder: well supported  Vagina: normal mucosa, no lesions, discharge absent, strength 4/5  Uterus: normal size, mobile, nontender  Cervix: normal os, no lesions or bleeding  Adnexa:normal size, bilaterally nontender, no palpable masses  Cul-de-sac: normal  R/V: normal perineum, no hemorrhoids  EXTREMITIES:  nontender without edema        Assessment:     Mónica Earl is a 28 year old  female who presents for postpartum visit   Patient Active Problem List   Diagnosis    Chronic hypertension affecting pregnancy (HCC)    GDM, likely pregestational    Obesity affecting pregnancy in third trimester (HCC)    At risk for aneuploidy  - high risk ffDNA    Breech presentation (HCC)    Unstable fetal lie (HCC)    Pregnancy (HCC)     (normal spontaneous vaginal delivery) (HCC)    Encounter for induction of labor (HCC)         Plan:     Postpartum exam   - doing well  - no abnormal findings on physical exam   - may return to normal activity   - pap up to date    Contraception counseling   - discussion held with patient about family planning and contraception  - pt unsure what she wants. Info given she will consider options and let me know via [employer].    cHTN  - bp still slightly elevated, recommend f/u with PCP in 1-2 months for ongoing management    GMDA2  - 2 hr GTT ordered for 8-12 weeks post-delivery    All of the findings and plan were discussed with the patient.  She notes understanding and agrees with the plan of care.  All questions were answered to the best of my ability at this time.    RTC in 1 year for well woman exam or sooner if needed     Eloina Hussein, DO

## 2024-04-16 ENCOUNTER — TELEPHONE (OUTPATIENT)
Dept: OBGYN UNIT | Facility: HOSPITAL | Age: 29
End: 2024-04-16

## 2024-07-09 ENCOUNTER — TELEPHONE (OUTPATIENT)
Dept: OBGYN CLINIC | Facility: CLINIC | Age: 29
End: 2024-07-09

## 2024-07-16 ENCOUNTER — LAB ENCOUNTER (OUTPATIENT)
Dept: LAB | Facility: HOSPITAL | Age: 29
End: 2024-07-16
Attending: OBSTETRICS & GYNECOLOGY
Payer: MEDICAID

## 2024-07-16 DIAGNOSIS — Z13.0 SCREENING FOR DEFICIENCY ANEMIA: ICD-10-CM

## 2024-07-16 DIAGNOSIS — Z13.29 THYROID DISORDER SCREEN: ICD-10-CM

## 2024-07-16 DIAGNOSIS — Z13.220 LIPID SCREENING: ICD-10-CM

## 2024-07-16 DIAGNOSIS — Z00.00 WELLNESS EXAMINATION: ICD-10-CM

## 2024-07-16 DIAGNOSIS — Z86.32 HISTORY OF GESTATIONAL DIABETES: ICD-10-CM

## 2024-07-16 DIAGNOSIS — O24.414 INSULIN CONTROLLED GESTATIONAL DIABETES MELLITUS (GDM) IN THIRD TRIMESTER (HCC): ICD-10-CM

## 2024-07-16 LAB
ALBUMIN SERPL-MCNC: 4.5 G/DL (ref 3.2–4.8)
ALBUMIN/GLOB SERPL: 1.5 {RATIO} (ref 1–2)
ALP LIVER SERPL-CCNC: 67 U/L
ALT SERPL-CCNC: 14 U/L
ANION GAP SERPL CALC-SCNC: 7 MMOL/L (ref 0–18)
AST SERPL-CCNC: 15 U/L (ref ?–34)
BASOPHILS # BLD AUTO: 0.04 X10(3) UL (ref 0–0.2)
BASOPHILS NFR BLD AUTO: 0.5 %
BILIRUB SERPL-MCNC: 0.6 MG/DL (ref 0.3–1.2)
BUN BLD-MCNC: 6 MG/DL (ref 9–23)
BUN/CREAT SERPL: 7.6 (ref 10–20)
CALCIUM BLD-MCNC: 9.2 MG/DL (ref 8.7–10.4)
CHLORIDE SERPL-SCNC: 110 MMOL/L (ref 98–112)
CHOLEST SERPL-MCNC: 154 MG/DL (ref ?–200)
CO2 SERPL-SCNC: 24 MMOL/L (ref 21–32)
CREAT BLD-MCNC: 0.79 MG/DL
DEPRECATED RDW RBC AUTO: 41.1 FL (ref 35.1–46.3)
EGFRCR SERPLBLD CKD-EPI 2021: 103 ML/MIN/1.73M2 (ref 60–?)
EOSINOPHIL # BLD AUTO: 0.14 X10(3) UL (ref 0–0.7)
EOSINOPHIL NFR BLD AUTO: 1.7 %
ERYTHROCYTE [DISTWIDTH] IN BLOOD BY AUTOMATED COUNT: 14.2 % (ref 11–15)
EST. AVERAGE GLUCOSE BLD GHB EST-MCNC: 111 MG/DL (ref 68–126)
FASTING PATIENT LIPID ANSWER: YES
FASTING STATUS PATIENT QL REPORTED: YES
GLOBULIN PLAS-MCNC: 3.1 G/DL (ref 2–3.5)
GLUCOSE 1H P GLC SERPL-MCNC: 122 MG/DL
GLUCOSE BLD-MCNC: 92 MG/DL (ref 70–99)
GLUCOSE P FAST SERPL-MCNC: 91 MG/DL (ref 70–99)
HBA1C MFR BLD: 5.5 % (ref ?–5.7)
HCT VFR BLD AUTO: 37.9 %
HDLC SERPL-MCNC: 46 MG/DL (ref 40–59)
HGB BLD-MCNC: 12.5 G/DL
IMM GRANULOCYTES # BLD AUTO: 0.02 X10(3) UL (ref 0–1)
IMM GRANULOCYTES NFR BLD: 0.2 %
LDLC SERPL CALC-MCNC: 90 MG/DL (ref ?–100)
LYMPHOCYTES # BLD AUTO: 2.97 X10(3) UL (ref 1–4)
LYMPHOCYTES NFR BLD AUTO: 35.7 %
MCH RBC QN AUTO: 26 PG (ref 26–34)
MCHC RBC AUTO-ENTMCNC: 33 G/DL (ref 31–37)
MCV RBC AUTO: 79 FL
MONOCYTES # BLD AUTO: 0.77 X10(3) UL (ref 0.1–1)
MONOCYTES NFR BLD AUTO: 9.2 %
NEUTROPHILS # BLD AUTO: 4.39 X10 (3) UL (ref 1.5–7.7)
NEUTROPHILS # BLD AUTO: 4.39 X10(3) UL (ref 1.5–7.7)
NEUTROPHILS NFR BLD AUTO: 52.7 %
NONHDLC SERPL-MCNC: 108 MG/DL (ref ?–130)
OSMOLALITY SERPL CALC.SUM OF ELEC: 289 MOSM/KG (ref 275–295)
PLATELET # BLD AUTO: 271 10(3)UL (ref 150–450)
POTASSIUM SERPL-SCNC: 3.9 MMOL/L (ref 3.5–5.1)
PROT SERPL-MCNC: 7.6 G/DL (ref 5.7–8.2)
RBC # BLD AUTO: 4.8 X10(6)UL
SODIUM SERPL-SCNC: 141 MMOL/L (ref 136–145)
TRIGL SERPL-MCNC: 94 MG/DL (ref 30–149)
TSI SER-ACNC: 0.69 MIU/ML (ref 0.55–4.78)
VLDLC SERPL CALC-MCNC: 15 MG/DL (ref 0–30)
WBC # BLD AUTO: 8.3 X10(3) UL (ref 4–11)

## 2024-07-16 PROCEDURE — 80061 LIPID PANEL: CPT

## 2024-07-16 PROCEDURE — 84443 ASSAY THYROID STIM HORMONE: CPT

## 2024-07-16 PROCEDURE — 82951 GLUCOSE TOLERANCE TEST (GTT): CPT

## 2024-07-16 PROCEDURE — 85025 COMPLETE CBC W/AUTO DIFF WBC: CPT

## 2024-07-16 PROCEDURE — 80053 COMPREHEN METABOLIC PANEL: CPT

## 2024-07-16 PROCEDURE — 36415 COLL VENOUS BLD VENIPUNCTURE: CPT

## 2024-07-16 PROCEDURE — 83036 HEMOGLOBIN GLYCOSYLATED A1C: CPT

## (undated) NOTE — LETTER
Date & Time: 6/17/2020, 3:21 PM  Patient: Elle Majano  Encounter Provider(s):    Cheli Argueta       To Whom It May Concern:    Delmis Mclean was seen and treated in our department on 6/17/2020. She can return to work.     If you have any ques

## (undated) NOTE — LETTER
Dear New Mom,    We hope you are doing well. If, for any reason, you have questions or concerns about your health or your baby’s health, please contact your provider or your pediatrician or family medicine physician regarding your baby.     At [facility], we feel that postpartum support is very important for new families. Please see the enclosed new parent support flyer that lists support programs and resources with both in-person and online options.     Additionally, our Breastfeeding Centers at [facility] and [facility] in [place], offer outpatient visits with our International Board-Certified Lactation   Consultants (IBCLCs) for any breastfeeding concerns or questions you may have.    For issues related to stress, anxiety or depression, we have a Nurturing Mom support group that meets both in-person or online.  There’s also a 24-hour Mom’s Line where you can request a phone call from a clinical therapist for assistance for postpartum depression.    We encourage you to take advantage of these programs and resources as you recover from childbirth and learn to care for your new infant.    Best wishes,    [facility]            [id number]

## (undated) NOTE — LETTER
AUTHORIZATION FOR SURGICAL OPERATION OR OTHER PROCEDURE    1.  I hereby authorize Dr. Jayy Carrasquillo, and [facility] staff assigned to my case to perform the following operation and/or procedure at the [facility]:    ___________________________________ Time:  ________ A. M.  P.M.        Patient Name:  ______________________________________________________  (please print)      Patient signature:  ___________________________________________________             Relationship to Patient:           []  Parent

## (undated) NOTE — LETTER
5/28/2022              Mónica Zhen Wes        [address] [address]         Dear Khang Beauchamp,     We have not been able to reach you by phone. We have lab results we would like to discuss with you. Please call our office as soon as you receive this letter.     Sincerely,    Fransisco Bruce, [address]  [phone]

## (undated) NOTE — LETTER
[facility]  Administration of Anesthesia  IMónica agree to be cared for by a physician anesthesiologist alone and/or with a nurse anesthetist, who is specially trained to monitor me and give me medicine to put me to sleep or keep me comfortable during my procedure    I understand that my anesthesiologist and/or anesthetist is not an employee or agent of [facility] or [employer]. He or she works for [facility] [employer] [facility] [facility]    As the patient asking for anesthesia services, I agree to:  Allow the anesthesiologist (anesthesia doctor) to give me medicine and do additional procedures as necessary. Some examples are: Starting or using an “IV” to give me medicine, fluids or blood during my procedure, and having a breathing tube placed to help me breathe when I’m asleep (intubation). In the event that my heart stops working properly, I understand that my anesthesiologist will make every effort to sustain my life, unless otherwise directed by [facility] Do Not Resuscitate documents.  Tell my anesthesia doctor before my procedure:  If I am pregnant.  The last time that I ate or drank.  iii. All of the medicines I take (including prescriptions, herbal supplements, and pills I can buy without a prescription (including street drugs/illegal medications). Failure to inform my anesthesiologist about these medicines may increase my risk of anesthetic complications.  iv.If I am allergic to anything or have had a reaction to anesthesia before.  I understand how the anesthesia medicine will help me (benefits).  I understand that with any type of anesthesia medicine there are risks:  The most common risks are: nausea, vomiting, sore throat, muscle soreness, damage to my eyes, mouth, or teeth (from breathing tube placement).  Rare risks include: remembering what happened during my procedure, allergic reactions to medications, injury to my airway, heart, lungs, vision, nerves, or  muscles and in extremely rare instances death.  My doctor has explained to me other choices available to me for my care (alternatives).  Pregnant Patients (“epidural”):  I understand that the risks of having an epidural (medicine given into my back to help control pain during labor), include itching, low blood pressure, difficulty urinating, headache or slowing of the baby’s heart. Very rare risks include infection, bleeding, seizure, irregular heart rhythms and nerve injury.  Regional Anesthesia (“spinal”, “epidural”, & “nerve blocks”):  I understand that rare but potential complications include headache, bleeding, infection, seizure, irregular heart rhythms, and nerve injury.    _____________________________________________________________________________  Patient (or Representative) Signature/Relationship to Patient  Date   Time    _____________________________________________________________________________   Name (if used)    Language/Organization   Time    _____________________________________________________________________________  Nurse Anesthetist Signature     Date   Time  _____________________________________________________________________________  Anesthesiologist Signature     Date   Time  I have discussed the procedure and information above with the patient (or patient’s representative) and answered their questions. The patient or their representative has agreed to have anesthesia services.    _____________________________________________________________________________  Witness        Date   Time  I have verified that the signature is that of the patient or patient’s representative, and that it was signed before the procedure  Patient Name: Mónica Earl     : 1995                 Printed: 2024 at 7:50 AM    Medical Record #: [id number]                                            Page 1 of 1  ----------ANESTHESIA CONSENT----------

## (undated) NOTE — LETTER
AUTHORIZATION FOR SURGICAL OPERATION OR OTHER PROCEDURE    1. I hereby authorize Dr. Gus Lopez, and [facility] staff assigned to my case to perform the following operation and/or procedure at the [facility]:    Nexplanon Insertion    2. Relationship to Patient:           []  Parent    Responsible person                          []  Spouse  In case of minor or                    [] Other  _____________   Incompetent name:  __________________________________________________

## (undated) NOTE — LETTER
Date: 11/15/2019    Patient Name: Varun Kramer          To Whom it may concern: This letter has been written at the patient's request. The above patient was seen at the [facility] and was given her MMR vaccine.  Administered today on he

## (undated) NOTE — LETTER
24    RE: Mónica Earl    : 1995    To Whom It May Concern:    Our patient, Mónica Earl,      __X__Has received treatment in our office on _____24, delivered her baby on 24__________________.        _____Has been hospitalized on the following dates ______________________.       _____ Has been ill and unable to work from _____________________________.        __X__ May resume work / school on ____4/15/24 with no restrictions______.      _____ May resume work / school with the following restrictions ______________    __________________________________________________________.      _____ May participate in physical education.      _____ May participate in a prenatal exercise class / yoga class.      _____ Patient is pregnant with a due date of Estimated Date of Delivery: 3/14/24      _____ Other _____________________________________________________     __________________________________________________________       Eloina Emerson, DO

## (undated) NOTE — LETTER
AUTHORIZATION FOR SURGICAL OPERATION OR OTHER PROCEDURE    1. I hereby authorize Anni Kincaid CNM and [facility] staff assigned to my case to perform the following operation and/or procedure at the [facility]:    _______________________________________________________________________________________________    Nexplanon Removal   _______________________________________________________________________________________________    2. My physician has explained the nature and purpose of the operation or other procedure, possible alternative methods of treatment, the risks involved, and the possibility of complication to me. I acknowledge that no guarantee has been made as to the result that may be obtained. 3.  I recognize that, during the course of this operation, or other procedure, unforseen conditions may necessitate additional or different procedure than those listed above. I, therefore, further authorize and request that the above named physician, his/her physician assistants or designees perform such procedures as are, in his/her professional opinion, necessary and desirable. 4.  Any tissue or organs removed in the operation or other procedure may be disposed of by and at the discretion of the [facility] and [facility]. 5.  I understand that in the event of a medical emergency, I will be transported by local paramedics to [facility] or other [facility] emergency department. 6.  I certify that I have read and fully understand the above consent to operation and/or other procedure. 7.  I acknowledge that my physician has explained sedation/analgesia administration to me including the risks and benefits. I consent to the administration of sedation/analgesia as may be necessary or desirable in the judgement of my physician.     Witness signature: ___________________________________________________ Date:  ______/______/_____                    Time: ________ A. M.  P.M. Patient Name:  ______________________________________________________  (please print)      Patient signature:  ___________________________________________________             Relationship to Patient:           []  Parent    Responsible person                          []  Spouse  In case of minor or                    [] Other  _____________   Incompetent name:  __________________________________________________                               (please print)      _____________      Responsible person  In case of minor or  Incompetent signature:  _______________________________________________    Statement of Physician  My signature below affirms that prior to the time of the procedure, I have explained to the patient and/or his/her guardian, the risks and benefits involved in the proposed treatment and any reasonable alternative to the proposed treatment. I have also explained the risks and benefits involved in the refusal of the proposed treatment and have answered the patient's questions.                         Date:  ______/______/_______  Provider                      Signature:  __________________________________________________________       Time:  ___________ A.M    P.M.